# Patient Record
Sex: FEMALE | Race: WHITE | NOT HISPANIC OR LATINO | Employment: OTHER | URBAN - METROPOLITAN AREA
[De-identification: names, ages, dates, MRNs, and addresses within clinical notes are randomized per-mention and may not be internally consistent; named-entity substitution may affect disease eponyms.]

---

## 2021-05-27 ENCOUNTER — RECORDS - HEALTHEAST (OUTPATIENT)
Dept: ADMINISTRATIVE | Facility: CLINIC | Age: 66
End: 2021-05-27

## 2021-06-02 ENCOUNTER — RECORDS - HEALTHEAST (OUTPATIENT)
Dept: ADMINISTRATIVE | Facility: CLINIC | Age: 66
End: 2021-06-02

## 2022-10-13 ENCOUNTER — NURSE TRIAGE (OUTPATIENT)
Dept: NURSING | Facility: CLINIC | Age: 67
End: 2022-10-13

## 2022-10-13 ENCOUNTER — HOSPITAL ENCOUNTER (OUTPATIENT)
Facility: HOSPITAL | Age: 67
Setting detail: OBSERVATION
Discharge: HOME | End: 2022-10-15
Attending: EMERGENCY MEDICINE | Admitting: FAMILY MEDICINE
Payer: COMMERCIAL

## 2022-10-13 DIAGNOSIS — N39.0 URINARY TRACT INFECTION WITHOUT HEMATURIA, SITE UNSPECIFIED: ICD-10-CM

## 2022-10-13 DIAGNOSIS — T50.904A DRUG OVERDOSE OF UNDETERMINED INTENT, INITIAL ENCOUNTER: ICD-10-CM

## 2022-10-13 DIAGNOSIS — J96.01 ACUTE RESPIRATORY FAILURE WITH HYPOXIA (H): ICD-10-CM

## 2022-10-13 DIAGNOSIS — R40.4 TRANSIENT ALTERATION OF AWARENESS: ICD-10-CM

## 2022-10-13 LAB
ALBUMIN SERPL BCG-MCNC: 4.5 G/DL (ref 3.5–5.2)
ALBUMIN UR-MCNC: 300 MG/DL
ALP SERPL-CCNC: 97 U/L (ref 35–104)
ALT SERPL W P-5'-P-CCNC: 20 U/L (ref 10–35)
AMORPH CRY #/AREA URNS HPF: ABNORMAL /HPF
AMPHETAMINES UR QL SCN: ABNORMAL
ANION GAP SERPL CALCULATED.3IONS-SCNC: 13 MMOL/L (ref 7–15)
APAP SERPL-MCNC: <5 UG/ML (ref 10–30)
APPEARANCE UR: ABNORMAL
AST SERPL W P-5'-P-CCNC: 36 U/L (ref 10–35)
BACTERIA #/AREA URNS HPF: ABNORMAL /HPF
BARBITURATES UR QL SCN: ABNORMAL
BASOPHILS # BLD AUTO: 0.1 10E3/UL (ref 0–0.2)
BASOPHILS NFR BLD AUTO: 0 %
BENZODIAZ UR QL SCN: ABNORMAL
BILIRUB SERPL-MCNC: 0.3 MG/DL
BILIRUB UR QL STRIP: NEGATIVE
BUN SERPL-MCNC: 7.1 MG/DL (ref 8–23)
BZE UR QL SCN: ABNORMAL
CALCIUM SERPL-MCNC: 9.2 MG/DL (ref 8.8–10.2)
CANNABINOIDS UR QL SCN: ABNORMAL
CHLORIDE SERPL-SCNC: 103 MMOL/L (ref 98–107)
COLOR UR AUTO: YELLOW
CREAT SERPL-MCNC: 1.16 MG/DL (ref 0.51–0.95)
DEPRECATED HCO3 PLAS-SCNC: 23 MMOL/L (ref 22–29)
EOSINOPHIL # BLD AUTO: 0.3 10E3/UL (ref 0–0.7)
EOSINOPHIL NFR BLD AUTO: 2 %
ERYTHROCYTE [DISTWIDTH] IN BLOOD BY AUTOMATED COUNT: 13.7 % (ref 10–15)
ETHANOL SERPL-MCNC: <0.01 G/DL
GFR SERPL CREATININE-BSD FRML MDRD: 51 ML/MIN/1.73M2
GLUCOSE SERPL-MCNC: 157 MG/DL (ref 70–99)
GLUCOSE UR STRIP-MCNC: NEGATIVE MG/DL
HCT VFR BLD AUTO: 42.8 % (ref 35–47)
HGB BLD-MCNC: 13.8 G/DL (ref 11.7–15.7)
HGB UR QL STRIP: ABNORMAL
HOLD SPECIMEN: NORMAL
HYALINE CASTS: 266 /LPF
IMM GRANULOCYTES # BLD: 0.2 10E3/UL
IMM GRANULOCYTES NFR BLD: 1 %
KETONES UR STRIP-MCNC: NEGATIVE MG/DL
LEUKOCYTE ESTERASE UR QL STRIP: NEGATIVE
LIPASE SERPL-CCNC: 44 U/L (ref 13–60)
LYMPHOCYTES # BLD AUTO: 2.4 10E3/UL (ref 0.8–5.3)
LYMPHOCYTES NFR BLD AUTO: 15 %
MCH RBC QN AUTO: 28 PG (ref 26.5–33)
MCHC RBC AUTO-ENTMCNC: 32.2 G/DL (ref 31.5–36.5)
MCV RBC AUTO: 87 FL (ref 78–100)
MONOCYTES # BLD AUTO: 1 10E3/UL (ref 0–1.3)
MONOCYTES NFR BLD AUTO: 6 %
MUCOUS THREADS #/AREA URNS LPF: PRESENT /LPF
NEUTROPHILS # BLD AUTO: 12.4 10E3/UL (ref 1.6–8.3)
NEUTROPHILS NFR BLD AUTO: 76 %
NITRATE UR QL: NEGATIVE
NRBC # BLD AUTO: 0 10E3/UL
NRBC BLD AUTO-RTO: 0 /100
OPIATES UR QL SCN: ABNORMAL
PCP QUAL URINE (ROCHE): ABNORMAL
PH UR STRIP: 5.5 [PH] (ref 5–7)
PLATELET # BLD AUTO: 264 10E3/UL (ref 150–450)
POTASSIUM SERPL-SCNC: 3.9 MMOL/L (ref 3.4–5.3)
PROT SERPL-MCNC: 7.5 G/DL (ref 6.4–8.3)
RBC # BLD AUTO: 4.92 10E6/UL (ref 3.8–5.2)
RBC URINE: 2 /HPF
SALICYLATES SERPL-MCNC: <0.5 MG/DL
SODIUM SERPL-SCNC: 139 MMOL/L (ref 136–145)
SP GR UR STRIP: 1.04 (ref 1–1.03)
SQUAMOUS EPITHELIAL: 4 /HPF
T4 FREE SERPL-MCNC: 1 NG/DL (ref 0.9–1.7)
TSH SERPL DL<=0.005 MIU/L-ACNC: 4.23 UIU/ML (ref 0.3–4.2)
UROBILINOGEN UR STRIP-MCNC: 2 MG/DL
WBC # BLD AUTO: 16.2 10E3/UL (ref 4–11)
WBC URINE: 7 /HPF

## 2022-10-13 PROCEDURE — 80179 DRUG ASSAY SALICYLATE: CPT | Performed by: EMERGENCY MEDICINE

## 2022-10-13 PROCEDURE — 80143 DRUG ASSAY ACETAMINOPHEN: CPT | Performed by: EMERGENCY MEDICINE

## 2022-10-13 PROCEDURE — 36415 COLL VENOUS BLD VENIPUNCTURE: CPT | Performed by: EMERGENCY MEDICINE

## 2022-10-13 PROCEDURE — 80053 COMPREHEN METABOLIC PANEL: CPT | Performed by: EMERGENCY MEDICINE

## 2022-10-13 PROCEDURE — 84443 ASSAY THYROID STIM HORMONE: CPT | Performed by: EMERGENCY MEDICINE

## 2022-10-13 PROCEDURE — 81001 URINALYSIS AUTO W/SCOPE: CPT | Performed by: EMERGENCY MEDICINE

## 2022-10-13 PROCEDURE — 84439 ASSAY OF FREE THYROXINE: CPT | Performed by: EMERGENCY MEDICINE

## 2022-10-13 PROCEDURE — 85025 COMPLETE CBC W/AUTO DIFF WBC: CPT | Performed by: EMERGENCY MEDICINE

## 2022-10-13 PROCEDURE — 83690 ASSAY OF LIPASE: CPT | Performed by: EMERGENCY MEDICINE

## 2022-10-13 PROCEDURE — 93005 ELECTROCARDIOGRAM TRACING: CPT | Performed by: EMERGENCY MEDICINE

## 2022-10-13 PROCEDURE — 82077 ASSAY SPEC XCP UR&BREATH IA: CPT | Performed by: EMERGENCY MEDICINE

## 2022-10-13 PROCEDURE — 99285 EMERGENCY DEPT VISIT HI MDM: CPT | Mod: 25

## 2022-10-13 PROCEDURE — 80307 DRUG TEST PRSMV CHEM ANLYZR: CPT | Performed by: EMERGENCY MEDICINE

## 2022-10-13 RX ORDER — NITROFURANTOIN 25; 75 MG/1; MG/1
100 CAPSULE ORAL 2 TIMES DAILY
Qty: 14 CAPSULE | Refills: 0 | Status: SHIPPED | OUTPATIENT
Start: 2022-10-13 | End: 2022-10-15

## 2022-10-13 ASSESSMENT — ACTIVITIES OF DAILY LIVING (ADL)
ADLS_ACUITY_SCORE: 35
ADLS_ACUITY_SCORE: 35

## 2022-10-14 ENCOUNTER — APPOINTMENT (OUTPATIENT)
Dept: MRI IMAGING | Facility: HOSPITAL | Age: 67
End: 2022-10-14
Attending: EMERGENCY MEDICINE
Payer: COMMERCIAL

## 2022-10-14 ENCOUNTER — APPOINTMENT (OUTPATIENT)
Dept: RADIOLOGY | Facility: HOSPITAL | Age: 67
End: 2022-10-14
Attending: STUDENT IN AN ORGANIZED HEALTH CARE EDUCATION/TRAINING PROGRAM
Payer: COMMERCIAL

## 2022-10-14 PROBLEM — J44.9 CHRONIC OBSTRUCTIVE PULMONARY DISEASE, UNSPECIFIED COPD TYPE (H): Status: ACTIVE | Noted: 2022-02-08

## 2022-10-14 PROBLEM — F32.5 MAJOR DEPRESSIVE DISORDER WITH SINGLE EPISODE, IN FULL REMISSION (H): Status: ACTIVE | Noted: 2021-05-05

## 2022-10-14 PROBLEM — K21.9 GASTROESOPHAGEAL REFLUX DISEASE WITHOUT ESOPHAGITIS: Status: ACTIVE | Noted: 2017-03-06

## 2022-10-14 LAB
ATRIAL RATE - MUSE: 56 BPM
DIASTOLIC BLOOD PRESSURE - MUSE: 85 MMHG
GLUCOSE BLDC GLUCOMTR-MCNC: 99 MG/DL (ref 70–99)
INTERPRETATION ECG - MUSE: NORMAL
P AXIS - MUSE: 57 DEGREES
PR INTERVAL - MUSE: 168 MS
QRS DURATION - MUSE: 78 MS
QT - MUSE: 424 MS
QTC - MUSE: 409 MS
R AXIS - MUSE: -20 DEGREES
SARS-COV-2 RNA RESP QL NAA+PROBE: NEGATIVE
SYSTOLIC BLOOD PRESSURE - MUSE: 173 MMHG
T AXIS - MUSE: 64 DEGREES
VENTRICULAR RATE- MUSE: 56 BPM

## 2022-10-14 PROCEDURE — 70553 MRI BRAIN STEM W/O & W/DYE: CPT

## 2022-10-14 PROCEDURE — G0378 HOSPITAL OBSERVATION PER HR: HCPCS

## 2022-10-14 PROCEDURE — A9585 GADOBUTROL INJECTION: HCPCS | Performed by: FAMILY MEDICINE

## 2022-10-14 PROCEDURE — U0003 INFECTIOUS AGENT DETECTION BY NUCLEIC ACID (DNA OR RNA); SEVERE ACUTE RESPIRATORY SYNDROME CORONAVIRUS 2 (SARS-COV-2) (CORONAVIRUS DISEASE [COVID-19]), AMPLIFIED PROBE TECHNIQUE, MAKING USE OF HIGH THROUGHPUT TECHNOLOGIES AS DESCRIBED BY CMS-2020-01-R: HCPCS | Performed by: STUDENT IN AN ORGANIZED HEALTH CARE EDUCATION/TRAINING PROGRAM

## 2022-10-14 PROCEDURE — 250N000011 HC RX IP 250 OP 636: Performed by: STUDENT IN AN ORGANIZED HEALTH CARE EDUCATION/TRAINING PROGRAM

## 2022-10-14 PROCEDURE — 250N000013 HC RX MED GY IP 250 OP 250 PS 637: Performed by: STUDENT IN AN ORGANIZED HEALTH CARE EDUCATION/TRAINING PROGRAM

## 2022-10-14 PROCEDURE — 93005 ELECTROCARDIOGRAM TRACING: CPT | Performed by: EMERGENCY MEDICINE

## 2022-10-14 PROCEDURE — 71046 X-RAY EXAM CHEST 2 VIEWS: CPT

## 2022-10-14 PROCEDURE — 96375 TX/PRO/DX INJ NEW DRUG ADDON: CPT | Mod: 59

## 2022-10-14 PROCEDURE — 96374 THER/PROPH/DIAG INJ IV PUSH: CPT

## 2022-10-14 PROCEDURE — 93010 ELECTROCARDIOGRAM REPORT: CPT | Performed by: INTERNAL MEDICINE

## 2022-10-14 PROCEDURE — 255N000002 HC RX 255 OP 636: Performed by: FAMILY MEDICINE

## 2022-10-14 PROCEDURE — 99220 PR INITIAL OBSERVATION CARE,LEVEL III: CPT | Mod: GC | Performed by: STUDENT IN AN ORGANIZED HEALTH CARE EDUCATION/TRAINING PROGRAM

## 2022-10-14 PROCEDURE — 250N000011 HC RX IP 250 OP 636: Performed by: EMERGENCY MEDICINE

## 2022-10-14 PROCEDURE — C9803 HOPD COVID-19 SPEC COLLECT: HCPCS

## 2022-10-14 PROCEDURE — 96376 TX/PRO/DX INJ SAME DRUG ADON: CPT | Mod: 59

## 2022-10-14 RX ORDER — ROSUVASTATIN CALCIUM 40 MG/1
40 TABLET, COATED ORAL DAILY
Status: DISCONTINUED | OUTPATIENT
Start: 2022-10-14 | End: 2022-10-15 | Stop reason: HOSPADM

## 2022-10-14 RX ORDER — AMLODIPINE BESYLATE 5 MG/1
5 TABLET ORAL DAILY
COMMUNITY
Start: 2022-05-19

## 2022-10-14 RX ORDER — ONDANSETRON 4 MG/1
4 TABLET, ORALLY DISINTEGRATING ORAL EVERY 6 HOURS PRN
Status: DISCONTINUED | OUTPATIENT
Start: 2022-10-14 | End: 2022-10-15 | Stop reason: HOSPADM

## 2022-10-14 RX ORDER — AMLODIPINE BESYLATE 5 MG/1
5 TABLET ORAL DAILY
Status: DISCONTINUED | OUTPATIENT
Start: 2022-10-14 | End: 2022-10-15 | Stop reason: HOSPADM

## 2022-10-14 RX ORDER — ERGOCALCIFEROL 1.25 MG/1
50000 CAPSULE ORAL
COMMUNITY
End: 2022-10-14

## 2022-10-14 RX ORDER — ROSUVASTATIN CALCIUM 40 MG/1
40 TABLET, COATED ORAL DAILY
COMMUNITY
Start: 2022-09-29

## 2022-10-14 RX ORDER — PROCHLORPERAZINE 25 MG
12.5 SUPPOSITORY, RECTAL RECTAL EVERY 12 HOURS PRN
Status: DISCONTINUED | OUTPATIENT
Start: 2022-10-14 | End: 2022-10-15 | Stop reason: HOSPADM

## 2022-10-14 RX ORDER — PSEUDOEPHEDRINE HCL 60 MG
60 TABLET ORAL EVERY 6 HOURS PRN
COMMUNITY

## 2022-10-14 RX ORDER — ALENDRONATE SODIUM 70 MG/1
70 TABLET ORAL
COMMUNITY
Start: 2022-03-25

## 2022-10-14 RX ORDER — BACLOFEN 10 MG/1
10 TABLET ORAL 3 TIMES DAILY PRN
COMMUNITY
Start: 2022-03-11

## 2022-10-14 RX ORDER — ONDANSETRON 2 MG/ML
4 INJECTION INTRAMUSCULAR; INTRAVENOUS ONCE
Status: COMPLETED | OUTPATIENT
Start: 2022-10-14 | End: 2022-10-14

## 2022-10-14 RX ORDER — LEVOTHYROXINE SODIUM 25 UG/1
50 TABLET ORAL DAILY
Status: DISCONTINUED | OUTPATIENT
Start: 2022-10-14 | End: 2022-10-15 | Stop reason: HOSPADM

## 2022-10-14 RX ORDER — GADOBUTROL 604.72 MG/ML
8 INJECTION INTRAVENOUS ONCE
Status: COMPLETED | OUTPATIENT
Start: 2022-10-14 | End: 2022-10-14

## 2022-10-14 RX ORDER — PANTOPRAZOLE SODIUM 40 MG/1
40 TABLET, DELAYED RELEASE ORAL DAILY
Status: DISCONTINUED | OUTPATIENT
Start: 2022-10-14 | End: 2022-10-15 | Stop reason: HOSPADM

## 2022-10-14 RX ORDER — PROCHLORPERAZINE MALEATE 5 MG
5 TABLET ORAL EVERY 6 HOURS PRN
Status: DISCONTINUED | OUTPATIENT
Start: 2022-10-14 | End: 2022-10-15 | Stop reason: HOSPADM

## 2022-10-14 RX ORDER — ALBUTEROL SULFATE 90 UG/1
1-2 AEROSOL, METERED RESPIRATORY (INHALATION) EVERY 6 HOURS PRN
COMMUNITY
Start: 2022-02-08

## 2022-10-14 RX ORDER — CITALOPRAM HYDROBROMIDE 20 MG/1
20 TABLET ORAL DAILY
COMMUNITY
Start: 2022-05-02

## 2022-10-14 RX ORDER — SIMVASTATIN 40 MG
40 TABLET ORAL
COMMUNITY
End: 2022-10-14

## 2022-10-14 RX ORDER — CITALOPRAM HYDROBROMIDE 10 MG/1
20 TABLET ORAL DAILY
Status: DISCONTINUED | OUTPATIENT
Start: 2022-10-14 | End: 2022-10-15 | Stop reason: HOSPADM

## 2022-10-14 RX ORDER — PANTOPRAZOLE SODIUM 40 MG/1
40 TABLET, DELAYED RELEASE ORAL DAILY
COMMUNITY
Start: 2022-02-22

## 2022-10-14 RX ORDER — ONDANSETRON 2 MG/ML
4 INJECTION INTRAMUSCULAR; INTRAVENOUS EVERY 6 HOURS PRN
Status: DISCONTINUED | OUTPATIENT
Start: 2022-10-14 | End: 2022-10-15 | Stop reason: HOSPADM

## 2022-10-14 RX ORDER — LEVOTHYROXINE SODIUM 50 UG/1
50 TABLET ORAL DAILY
COMMUNITY
Start: 2022-02-09

## 2022-10-14 RX ADMIN — ROSUVASTATIN CALCIUM 40 MG: 40 TABLET, COATED ORAL at 13:47

## 2022-10-14 RX ADMIN — FLUTICASONE FUROATE AND VILANTEROL TRIFENATATE 1 PUFF: 200; 25 POWDER RESPIRATORY (INHALATION) at 13:47

## 2022-10-14 RX ADMIN — LEVOTHYROXINE SODIUM 50 MCG: 0.03 TABLET ORAL at 13:47

## 2022-10-14 RX ADMIN — ONDANSETRON 4 MG: 2 INJECTION INTRAMUSCULAR; INTRAVENOUS at 10:38

## 2022-10-14 RX ADMIN — CITALOPRAM HYDROBROMIDE 20 MG: 20 TABLET ORAL at 13:46

## 2022-10-14 RX ADMIN — ONDANSETRON 4 MG: 2 INJECTION INTRAMUSCULAR; INTRAVENOUS at 06:21

## 2022-10-14 RX ADMIN — GADOBUTROL 8 ML: 604.72 INJECTION INTRAVENOUS at 11:59

## 2022-10-14 RX ADMIN — PANTOPRAZOLE SODIUM 40 MG: 40 TABLET, DELAYED RELEASE ORAL at 13:46

## 2022-10-14 RX ADMIN — PROCHLORPERAZINE EDISYLATE 5 MG: 5 INJECTION INTRAMUSCULAR; INTRAVENOUS at 13:55

## 2022-10-14 ASSESSMENT — ACTIVITIES OF DAILY LIVING (ADL)
ADLS_ACUITY_SCORE: 35

## 2022-10-14 NOTE — H&P
[facility]    History and Physical - Hospitalist Service       Date of Admission:  10/13/2022    Assessment & Plan      Carly Pelayo is a 67 year old female admitted on 10/13/2022. She as a history of HTN, MDD, asthma, hypothyroidism, HLD, and chronic back pain presenting with lethargy and nausea.    Acute hypoxic respiratory failure (requiring 1L O2)  Adverse drug reaction  Weakness  Nausea  Patient lives independently at home and has a history of low back pain on baclofen at home. Endorses nausea and fatigue in setting of taking 2 baclofen (total 20 mg dose) and 1/2 dose of buprenorphine yesterday (2 mg dose) which her sister gave her. ED had discussed case with [place] [facility] who attributed her symptoms of excessive somnolence to new buprenorphine + baclofen usage. Denies SI with usage. Discussed with sister and unlikely that she has taken anything else that has not been prescribed. Patient denies any CP/SOB. Unlikely that symptoms are due to PNA, spont PTX, COVID, flu, ACS, stroke, electrolyte imbalances, URI, hypoglycemia (BG 99). ETOH, acetaminophen and salicylate levels normal. TSH high normal. UA not overly concerning for UTI. Most likely her symptoms are due to medication combination of baclofen and suboxone in an opioid naive patient. Hypoxia is possibly chronic given her COPD. Not on home O2.  - Given that this is different behavior for her per sisters, MRI obtained by ED. Will follow up MRI results  - PT/OT for home safety eval  - Wean O2 as tolerated. Goal in COPD 88-92%  - CXR 2 view today  - PRN antiemetics available    +UDS  Tobacco dependence  UDS positive for meth but likely false positive due to pseudoephedrine daily usage. Smokes 1 ppw of cigarettes x 40-50 years. UDS + for cannabinoids although she tells me this is rare. No other reported drug use. Declines nicotine patches/gum/lozenges.     Chronic Medical Conditions  HTN - continue PTA amlodipine  Depression -  "continue PTA celexa  HLD - continue PTA statin  Back pain - hold PTA baclofen.       Diet: Regular Diet Adult   DVT Prophylaxis: Low Risk/Ambulatory with no VTE prophylaxis indicated  Betts Catheter: Not present  Fluids: PO  Central Lines: None  Cardiac Monitoring: None  Code Status: Full Code    Clinically Significant Risk Factors Present on Admission                    # Obesity: Estimated body mass index is 33.66 kg/m  as calculated from the following:    Height as of this encounter: 1.6 m (5' 3\").    Weight as of this encounter: 86.2 kg (190 lb).        Disposition Plan      Expected Discharge Date: 10/15/2022            Pending workup and off O2, possible discharge today vs tomorrow.     The patient's care was discussed with the Attending Physician, Dr. Cormier.    Renata Lovell MD  Hospitalist Service  [facility]  Securely message with the Vocera Web Console (learn more here)  Text page via [facility] Paging/Directory       ______________________________________________________________________    Chief Complaint   Weak    History is obtained from the patient and the patient's sister.    History of Present Illness   Carly Pelayo is a 67 year old female who has a history of HTN, HLD, hypothyroidism, depression and chronic low back pain and is admitted for lethargy and thought to be drug intoxication/adverse reaction.     Last night, Carly reports that she was having increased low back pain yesterday a bit more than her chronic low back pain and so she took her prescribed baclofen twice (20 mg total). She said it never usually works for her so that is also why she took two pills. She then went over to her sister's house in [facility] [place] and got there around 430pm. Her sister says that Carly was her normal self then. She had gone in to the bathroom and More (sister) thought it was her IBS as she was in there for 20 min. More entered the bathroom and found Carly hovered over the " "toilet moaning and groaning in pain. Of note, More says \"when Carly doesn't feel well, the whole world knows about it.\" She was saying that her stomach was hurting and that she felt nauseous. She came out of the bathroom and asked More for any pain pills for her back that was still hurting. More was about to take her scheduled medications at that time and had said no initially to Carly's request but did end up giving her half tab of her buprenorphine (2 mg). That happened around 5pm.  More then says a bit later, Carly was lethargic and looked pale, like she was about to pass out. Almost \"drunk\" in appearance. She had fallen asleep on the cough and was snoring loudly so they woke her up and asked what else she took (Sudafed as she does every day for allergies) and only had a Pepsi and a cookie all day. They were worried she was having a stroke so they brought her in.     She denies headache, cough, shortness of breath, chest pain, vomiting, abdominal pain, new constipation/diarrhea, numbness, tingling, swelling in extremities    Review of Systems    The 10 point Review of Systems is negative other than noted in the HPI or here.     Past Medical History    HTN  HLD  Hypothyroidism  COPD  GERD  Chronic low back pain  Cigarette smoker    Past Surgical History   Surgery Date Site/Laterality Comments   HYSTERECTOMY 1974   RAHAT/BSO , partial rectal excision; age 18      CHOLECYSTECTOMY 1998   L-scope      APPENDECTOMY 1998   laparotomy      open repair right wrist fracture 2010   titanium plate          Social History   Current smoker (1ppw x 40-50 years)  Rare ETOH use  Occ. Cannabis, no other illicit drug use.    Family History   No significant family history per patient    Prior to Admission Medications   Prior to Admission Medications   Prescriptions Last Dose Informant Patient Reported? Taking?   albuterol (PROAIR HFA/PROVENTIL HFA/VENTOLIN HFA) 108 (90 Base) MCG/ACT inhaler Unknown  Yes Yes   Sig: Inhale " 1-2 puffs into the lungs every 6 hours as needed   alendronate (FOSAMAX) 70 MG tablet 10/10/2022  Yes Yes   Sig: Take 70 mg by mouth every 7 days Mondays   amLODIPine (NORVASC) 5 MG tablet 10/13/2022  Yes Yes   Sig: Take 5 mg by mouth daily   baclofen (LIORESAL) 10 MG tablet 10/13/2022  Yes Yes   Sig: Take 10 mg by mouth 3 times daily as needed   citalopram (CELEXA) 20 MG tablet 10/13/2022  Yes Yes   Sig: Take 20 mg by mouth daily   fluticasone-vilanterol (BREO ELLIPTA) 200-25 MCG/INH inhaler 10/13/2022  Yes Yes   Sig: Inhale 1 puff into the lungs daily   levothyroxine (SYNTHROID/LEVOTHROID) 50 MCG tablet 10/13/2022  Yes Yes   Sig: Take 50 mcg by mouth daily   melatonin 1 MG TABS tablet 10/12/2022  Yes Yes   Sig: Take 1 mg by mouth At Bedtime   pantoprazole (PROTONIX) 40 MG EC tablet 10/13/2022  Yes Yes   Sig: Take 40 mg by mouth daily   pseudoePHEDrine (SUDAFED) 60 MG tablet 10/13/2022  Yes Yes   Sig: Take 60 mg by mouth every 6 hours as needed   rosuvastatin (CRESTOR) 40 MG tablet 10/13/2022  Yes Yes   Sig: Take 40 mg by mouth daily      Facility-Administered Medications: None     Allergies   Allergies   Allergen Reactions     Erythromycin Unknown     Morphine Unknown     Oxycodone Unknown     Seafood Unknown       Physical Exam   Vital Signs: Temp: 98  F (36.7  C) Temp src: Oral BP: 105/57 Pulse: 57   Resp: 16 SpO2: 95 % O2 Device: Nasal cannula Oxygen Delivery: 1 LPM  Weight: 190 lbs 0 oz    Constitutional: awake, alert, cooperative and no apparent distress  Eyes: lids and lashes normal, pupils equal, round and reactive to light and extra-ocular muscles intact  ENT: normocepalic, without obvious abnormality  Respiratory: No increased work of breathing, good air exchange, clear to auscultation bilaterally, no crackles or wheezing  Cardiovascular: regular rate and rhythm, normal S1 and S2, no murmur noted and no edema  GI: soft, non-distended and non-tender  Musculoskeletal: there is no redness, warmth, or  swelling of the joints  full range of motion noted  Neurologic: Awake, alert, oriented to name, place and time.  Cranial nerves II-XII are grossly intact.  Motor is 5 out of 5 bilaterally.    Neuropsychiatric: General: normal, calm and normal eye contact  Level of consciousness: alert / normal  Affect: normal and pleasant  Memory and insight: normal, memory for past and recent events intact and thought process normal    Data   Data reviewed today: I reviewed all medications, new labs and imaging results over the last 24 hours. I personally reviewed the EKG tracing showing sinus juan m.    Recent Labs   Lab 10/14/22  0617 10/13/22  2116   WBC  --  16.2*   HGB  --  13.8   MCV  --  87   PLT  --  264   NA  --  139   POTASSIUM  --  3.9   CHLORIDE  --  103   CO2  --  23   BUN  --  7.1*   CR  --  1.16*   ANIONGAP  --  13   GINO  --  9.2   GLC 99 157*   ALBUMIN  --  4.5   PROTTOTAL  --  7.5   BILITOTAL  --  0.3   ALKPHOS  --  97   ALT  --  20   AST  --  36*   LIPASE  --  44     No results found for this or any previous visit (from the past 24 hour(s)).

## 2022-10-14 NOTE — PHARMACY-ADMISSION MEDICATION HISTORY
Pharmacy Note - Admission Medication History    Pertinent Provider Information: Suboxone not added to list as it is not prescribed to patient.   ______________________________________________________________________    Prior To Admission (PTA) med list completed and updated in EMR.       PTA Med List   Medication Sig Last Dose     albuterol (PROAIR HFA/PROVENTIL HFA/VENTOLIN HFA) 108 (90 Base) MCG/ACT inhaler Inhale 1-2 puffs into the lungs every 6 hours as needed Unknown     alendronate (FOSAMAX) 70 MG tablet Take 70 mg by mouth every 7 days Mondays 10/10/2022     amLODIPine (NORVASC) 5 MG tablet Take 5 mg by mouth daily 10/13/2022     baclofen (LIORESAL) 10 MG tablet Take 10 mg by mouth 3 times daily as needed 10/13/2022     citalopram (CELEXA) 20 MG tablet Take 20 mg by mouth daily 10/13/2022     fluticasone-vilanterol (BREO ELLIPTA) 200-25 MCG/INH inhaler Inhale 1 puff into the lungs daily 10/13/2022     levothyroxine (SYNTHROID/LEVOTHROID) 50 MCG tablet Take 50 mcg by mouth daily 10/13/2022     melatonin 1 MG TABS tablet Take 1 mg by mouth At Bedtime 10/12/2022     nitroFURantoin macrocrystal-monohydrate (MACROBID) 100 MG capsule Take 1 capsule (100 mg) by mouth 2 times daily      pantoprazole (PROTONIX) 40 MG EC tablet Take 40 mg by mouth daily 10/13/2022     pseudoePHEDrine (SUDAFED) 60 MG tablet Take 60 mg by mouth every 6 hours as needed 10/13/2022     rosuvastatin (CRESTOR) 40 MG tablet Take 40 mg by mouth daily 10/13/2022       Information source(s): Patient and CareEverywhere/SureScripts  Method of interview communication: in-person    Summary of Changes to PTA Med List  New: all -- no prior records in EMR    Patient was asked about OTC/herbal products specifically.  PTA med list reflects this.    Allergies were reviewed, assessed, and updated with the patient.      Patient did not bring any medications to the hospital and can't retrieve from home. No multi-dose medications are available for use during  hospital stay.     The information provided in this note is only as accurate as the sources available at the time of the update(s).    Thank you for the opportunity to participate in the care of this patient.    Karo Lo [facility]  10/14/2022 9:31 AM

## 2022-10-14 NOTE — ED TRIAGE NOTES
"Pt arrives via [place] [facility]. Around 1800, pt's sister found pt lethargic. Pt states she took Two Baclofen and a \"pain medication\" her sister gave her. Pt states her stomach started hurting after taking the medicine. Pt keeps falling asleep and sats drop to high 80s. Pt placed on 2lpm O2.     Buprenorphine was the med she took at 1630 per pt's sister. She states \"it was a little tiny sliver of the medicine\" Per sister, pt states she has had nausea and abdominal pain since yesterday.      Triage Assessment       Row Name 10/13/22 0673       Triage Assessment (Adult)    Airway WDL WDL       Respiratory WDL    Respiratory WDL WDL       Skin Circulation/Temperature WDL    Skin Circulation/Temperature WDL WDL       Cardiac WDL    Cardiac WDL WDL       Peripheral/Neurovascular WDL    Peripheral Neurovascular WDL WDL       Cognitive/Neuro/Behavioral WDL    Cognitive/Neuro/Behavioral WDL X;arousability    Level of Consciousness lethargic    Orientation oriented x 4                  "

## 2022-10-14 NOTE — ED PROVIDER NOTES
"EMERGENCY DEPARTMENT ENCOUNTER      NAME: Carly Pelayo  AGE: 67 year old female  YOB: 1955  MRN: [id number]  EVALUATION DATE & TIME: 10/13/2022  8:50 PM    PCP: No primary care provider on file.    ED PROVIDER: Ayah Sim M.D.      Chief Complaint   Patient presents with     Drug Overdose     Lethargy         FINAL IMPRESSION:  1. Drug overdose of undetermined intent, initial encounter    2. Urinary tract infection without hematuria, site unspecified          ED COURSE & MEDICAL DECISION MAKING:    ED Course as of 10/14/22 0009   Thu Oct 13, 2022   2127 Pt floridly intoxicated appearing and falling asleep repeatedly during HPI aquisition, sister on phone admits to giving Carly a \"sliver\" of buprenorphine and paitent admits she took two of her baclofen instead of one tonight like usual for her chronic back pain. Neurovascularly intact except for very somnolent, no abdominal pain at all per her, just some slight nausea, she wasn't trying to treat any new pain with the medications tonight. EKG reassuring, UDS and EtOH  / acetaminophen/salicylate/TSH/LFTs pending and will clinically reassess for sobriety, family updated by NNAMDI Olson   5450 Pt with + cannabinoids and amphetamines, admits to using two instead of one baclofen and sister's buprenorphone, with intoxication. Will continue to await improved sobriety. TSH slightly elevated.  UA without substantial cells but with WBC 7 and a few squames. Possible early UTI, will begin macrobid.    7495 I spoke with [place] Poison control re: presentation who notes buprenorphine in patient who is not used to using it with double dose home baclofen would account for excessive somnolence, no use of tramadol/wellbutrin to trigger amphetamine +, she recommends observe 12h overnight for improvement in somnolence until mentaitng well, baclofen could lead to mild badycardia or hypotension (not in this case currently thankfully), when she appears sobered/improved " then okay to discharge, if respiratory compromise can administer naloxone if needed. Will reassess now.   Fri Oct 14, 2022   0004 Pt reassessed and still somewhat groggy appearing, admits to daily abuse of sudafed for over a year, then adds she did not realize it isn't okay to take sudafed every day. I spoke with one of her sisters at bedside, not the one who provided buprenorphine, who notes to patient that she has several times told patient she cannot use sudafed nearly daily or daily, and patient surprised to learn daily sudafed can be harmful or addictive. She denies SI, notes she did take double her baclofen and doesn't usually do that, and does reema to taking her other sister's suboxone simultaneously but vows not to do that in the future, aware it was dangerous and of poison control recommendation to watch overnight and discharge in the morning, other sister at bedside is happy to pick her up in the AM, adds patient did have history of using her sleeping medications above prescribed levels formerly, patient now promising to take her medications only as prescribed, lives alone, initially wanted to go home but now since she lives alone and s/p possible dangerous ingestion amenable to ED observation overnight.  Patient signed out to PM ED MD pending clinical reassessment in the AM and discharge in the morning after overdose.     9:09 PM Met with patient for initial interview and exam. Discussed initial plan for care for their stay in the emergency department.    Pertinent Labs & Imaging studies reviewed. (See chart for details)    N95 worn  A face shield was worn also  COVID PPE      At the conclusion of the encounter I discussed the results of all of the tests and the disposition. The questions were answered. The patient or family acknowledged understanding and was agreeable with the care plan.     Critical Care time was 45 minutes for this patient excluding procedures.      MEDICATIONS GIVEN IN THE  "EMERGENCY:  Medications - No data to display    NEW PRESCRIPTIONS STARTED AT TODAY'S ER VISIT  New Prescriptions    NITROFURANTOIN MACROCRYSTAL-MONOHYDRATE (MACROBID) 100 MG CAPSULE    Take 1 capsule (100 mg) by mouth 2 times daily          =================================================================    HPI  HPI limited secondary to intoxication    Carly Pelayo is a 67 year old female with PMHx of chronic fatigue, chronic constipation, COPD, obesity and mental health diagnoses who presents to the ED today via EMS with lethargy    Per nurse patient's sister found the patient to be lethargic. Patient's sister believes the patient took two of baclofen which is prescribed for her pain. Patient's sister reports having given the patient a \"sliver\" of buprenorphine at around 1630. She reports the patient began to develop nausea with dry heaving after this. Per nurse patient placed on oxygen as when she falls asleep sats drop to 80% on room air.     Per patient they repeatedly doze off during initial examination. Patient notes her back having hurt her earlier today for which she took two of baclofen. Patient notes her sister gave her 1/2 a pill which she just took despite not knowing what it was. She denies abdominal pain but is endorsing nausea. Patient denies any alcohol or drugs tonight as well as any other complaints at the time.     REVIEW OF SYSTEMS   ROS limited secondary to intoxication    PAST MEDICAL HISTORY:  History reviewed. No pertinent past medical history.    PAST SURGICAL HISTORY:  History reviewed. No pertinent surgical history.    CURRENT MEDICATIONS:    nitroFURantoin macrocrystal-monohydrate (MACROBID) 100 MG capsule        ALLERGIES:  Allergies   Allergen Reactions     Erythromycin Unknown     Morphine Unknown     Oxycodone Unknown     Seafood Unknown       FAMILY HISTORY:  No family history on file.    SOCIAL HISTORY:   Social History     Socioeconomic History     Marital status: Single "   Tobacco Use     Smoking status: Every Day       VITALS:  Patient Vitals for the past 24 hrs:   BP Pulse Resp SpO2 Weight   10/13/22 2206 115/56 67 16 96 % 86.2 kg (190 lb)   10/13/22 2104 139/67 -- 16 -- --       PHYSICAL EXAM    GENERAL: Awake, alert.  In no acute distress.   HEENT: Normocephalic, atraumatic. Pinpoint pupils.  Conjunctiva normal.  EOMI.  NECK: No stridor or apparent deformity.  PULMONARY: Symmetrical breath sounds without distress.  Lungs clear to auscultation bilaterally without wheezes, rhonchi or rales.  CARDIO: Regular rate and rhythm.  No significant murmur, rub or gallop.  Radial pulses strong and symmetrical.  ABDOMINAL: Abdomen soft, non-distended and non-tender to palpation.  No CVAT, no palpable hepatosplenomegaly.  EXTREMITIES: No lower extremity swelling or edema.    NEURO: oriented to person, place and time. Cranial nerves grossly intact.  No focal motor deficit. Dozing off during exam.  PSYCH: Normal mood. Intoxicated affect. Dozing off during exam.   SKIN: No rashes      LAB:  All pertinent labs reviewed and interpreted.  Results for orders placed or performed during the hospital encounter of 10/13/22   Extra Blue Top Tube   Result Value Ref Range    Hold Specimen JIC    Extra Red Top Tube   Result Value Ref Range    Hold Specimen JIC    Extra Green Top (Lithium Heparin) Tube   Result Value Ref Range    Hold Specimen JIC    Extra Purple Top Tube   Result Value Ref Range    Hold Specimen JIC    Comprehensive metabolic panel   Result Value Ref Range    Sodium 139 136 - 145 mmol/L    Potassium 3.9 3.4 - 5.3 mmol/L    Chloride 103 98 - 107 mmol/L    Carbon Dioxide (CO2) 23 22 - 29 mmol/L    Anion Gap 13 7 - 15 mmol/L    Urea Nitrogen 7.1 (L) 8.0 - 23.0 mg/dL    Creatinine 1.16 (H) 0.51 - 0.95 mg/dL    Calcium 9.2 8.8 - 10.2 mg/dL    Glucose 157 (H) 70 - 99 mg/dL    Alkaline Phosphatase 97 35 - 104 U/L    AST 36 (H) 10 - 35 U/L    ALT 20 10 - 35 U/L    Protein Total 7.5 6.4 - 8.3 g/dL     Albumin 4.5 3.5 - 5.2 g/dL    Bilirubin Total 0.3 <=1.2 mg/dL    GFR Estimate 51 (L) >60 mL/min/1.73m2   Result Value Ref Range    Lipase 44 13 - 60 U/L   TSH with free T4 reflex   Result Value Ref Range    TSH 4.23 (H) 0.30 - 4.20 uIU/mL   Ethyl Alcohol Level   Result Value Ref Range    Alcohol ethyl <0.01 (H) <=0.00 g/dL   Acetaminophen level   Result Value Ref Range    Acetaminophen <5.0 (L) 10.0 - 30.0 ug/mL   Salicylate level   Result Value Ref Range    Salicylate <0.5   mg/dL   UA with Microscopic reflex to Culture    Specimen: Urine, Catheter   Result Value Ref Range    Color Urine Yellow Colorless, Straw, Light Yellow, Yellow    Appearance Urine Turbid (A) Clear    Glucose Urine Negative Negative mg/dL    Bilirubin Urine Negative Negative    Ketones Urine Negative Negative mg/dL    Specific Gravity Urine 1.036 (H) 1.001 - 1.030    Blood Urine 0.2 mg/dL (A) Negative    pH Urine 5.5 5.0 - 7.0    Protein Albumin Urine 300 (A) Negative mg/dL    Urobilinogen Urine 2.0 (A) <2.0 mg/dL    Nitrite Urine Negative Negative    Leukocyte Esterase Urine Negative Negative    Bacteria Urine Few (A) None Seen /HPF    Mucus Urine Present (A) None Seen /LPF    Amorphous Crystals Urine Few (A) None Seen /HPF    RBC Urine 2 <=2 /HPF    WBC Urine 7 (H) <=5 /HPF    Squamous Epithelials Urine 4 (H) <=1 /HPF    Hyaline Casts Urine 266 (H) <=2 /LPF   CBC with platelets and differential   Result Value Ref Range    WBC Count 16.2 (H) 4.0 - 11.0 10e3/uL    RBC Count 4.92 3.80 - 5.20 10e6/uL    Hemoglobin 13.8 11.7 - 15.7 g/dL    Hematocrit 42.8 35.0 - 47.0 %    MCV 87 78 - 100 fL    MCH 28.0 26.5 - 33.0 pg    MCHC 32.2 31.5 - 36.5 g/dL    RDW 13.7 10.0 - 15.0 %    Platelet Count 264 150 - 450 10e3/uL    % Neutrophils 76 %    % Lymphocytes 15 %    % Monocytes 6 %    % Eosinophils 2 %    % Basophils 0 %    % Immature Granulocytes 1 %    NRBCs per 100 WBC 0 <1 /100    Absolute Neutrophils 12.4 (H) 1.6 - 8.3 10e3/uL    Absolute  Lymphocytes 2.4 0.8 - 5.3 10e3/uL    Absolute Monocytes 1.0 0.0 - 1.3 10e3/uL    Absolute Eosinophils 0.3 0.0 - 0.7 10e3/uL    Absolute Basophils 0.1 0.0 - 0.2 10e3/uL    Absolute Immature Granulocytes 0.2 <=0.4 10e3/uL    Absolute NRBCs 0.0 10e3/uL   Drug abuse screen 77 urine (FL, RH, SH)   Result Value Ref Range    Amphetamines Urine Screen Positive (A) Screen Negative    Barbituates Urine Screen Negative Screen Negative    Benzodiazepine Urine Screen Negative Screen Negative    Cannabinoids Urine Screen Positive (A) Screen Negative    Opiates Urine Screen Negative Screen Negative    PCP Urine Screen Negative Screen Negative    Cocaine Urine Screen Negative Screen Negative   Result Value Ref Range    Free T4 1.00 0.90 - 1.70 ng/dL           EKG:    Performed on 13-OCT-2022 21:09  Reviewed and interpreted as: HR 62 bpm. Normal sinus rhythm. No ST changes. When compared to EKG from 04/17/13 no changes.       I have independently reviewed and interpreted the EKG(s) documented above.        I, Ashley White, am serving as a scribe to document services personally performed by Dr. Ayah Sim based on my observation and the provider's statements to me. I, Ayah Sim MD attest that Ashley White is acting in a scribe capacity, has observed my performance of the services and has documented them in accordance with my direction.     Ayah Sim MD  10/14/22 0009

## 2022-10-14 NOTE — ED NOTES
"EMERGENCY DEPARTMENT SIGN OUT NOTE        ED COURSE AND MEDICAL DECISION MAKING  Patient was signed out to me by Dr Torres Perdomo at 0630  7:49 AM I spoke with [place], Dr. Britton.     In brief, Carly Pelayo is a 67 year old female who initially presented with lethargy and drug overdose. Patient's sister believes the patient took two baclofen, which is prescribed for the patient's pain. Patient's sister also reports giving the patient a \"sliver\" of buprenorphine at around 1630 on 10/13/2022. After being given this, sister reports the patient developed nausea and dry heaving.     At time of sign out, disposition was pending     Working diagnosis is that she took too much baclofen and Suboxone, however after 10 and half hours we tried to discharge her, ambulate her and she was hypoxic and she vomited.  She states that she does not feel normal.  She maintains a , nonfocal neurologic exam.    Is likely that this is related to medications, however when I spoke to the patient and her sister they state that this is not normal for the patient and they do not think it is the medications.  For this reason I added on a brain MRI and I do not feel that it would be safe to send her home at this time.  Plan is for observational admission.  I spoke to [facility] who accept the patient      FINAL IMPRESSION    1. Drug overdose of undetermined intent, initial encounter    2. Urinary tract infection without hematuria, site unspecified    3. Transient alteration of awareness    4. Acute respiratory failure with hypoxia (H)        ED MEDS  Medications   ondansetron (ZOFRAN) injection 4 mg (4 mg Intravenous Given 10/14/22 0621)       LAB  Labs Ordered and Resulted from Time of ED Arrival to Time of ED Departure   COMPREHENSIVE METABOLIC PANEL - Abnormal       Result Value    Sodium 139      Potassium 3.9      Chloride 103      Carbon Dioxide (CO2) 23      Anion Gap 13      Urea Nitrogen 7.1 (*)     " Creatinine 1.16 (*)     Calcium 9.2      Glucose 157 (*)     Alkaline Phosphatase 97      AST 36 (*)     ALT 20      Protein Total 7.5      Albumin 4.5      Bilirubin Total 0.3      GFR Estimate 51 (*)    TSH WITH FREE T4 REFLEX - Abnormal    TSH 4.23 (*)    ETHYL ALCOHOL LEVEL - Abnormal    Alcohol ethyl <0.01 (*)    ACETAMINOPHEN LEVEL - Abnormal    Acetaminophen <5.0 (*)    ROUTINE UA WITH MICROSCOPIC REFLEX TO CULTURE - Abnormal    Color Urine Yellow      Appearance Urine Turbid (*)     Glucose Urine Negative      Bilirubin Urine Negative      Ketones Urine Negative      Specific Gravity Urine 1.036 (*)     Blood Urine 0.2 mg/dL (*)     pH Urine 5.5      Protein Albumin Urine 300 (*)     Urobilinogen Urine 2.0 (*)     Nitrite Urine Negative      Leukocyte Esterase Urine Negative      Bacteria Urine Few (*)     Mucus Urine Present (*)     Amorphous Crystals Urine Few (*)     RBC Urine 2      WBC Urine 7 (*)     Squamous Epithelials Urine 4 (*)     Hyaline Casts Urine 266 (*)    CBC WITH PLATELETS AND DIFFERENTIAL - Abnormal    WBC Count 16.2 (*)     RBC Count 4.92      Hemoglobin 13.8      Hematocrit 42.8      MCV 87      MCH 28.0      MCHC 32.2      RDW 13.7      Platelet Count 264      % Neutrophils 76      % Lymphocytes 15      % Monocytes 6      % Eosinophils 2      % Basophils 0      % Immature Granulocytes 1      NRBCs per 100 WBC 0      Absolute Neutrophils 12.4 (*)     Absolute Lymphocytes 2.4      Absolute Monocytes 1.0      Absolute Eosinophils 0.3      Absolute Basophils 0.1      Absolute Immature Granulocytes 0.2      Absolute NRBCs 0.0     DRUG ABUSE SCREEN 77 URINE (FL, RH, SH) - Abnormal    Amphetamines Urine Screen Positive (*)     Barbituates Urine Screen Negative      Benzodiazepine Urine Screen Negative      Cannabinoids Urine Screen Positive (*)     Opiates Urine Screen Negative      PCP Urine Screen Negative      Cocaine Urine Screen Negative     LIPASE - Normal    Lipase 44     SALICYLATE  LEVEL - Normal    Salicylate <0.5     T4 FREE - Normal    Free T4 1.00     GLUCOSE BY METER - Normal    GLUCOSE BY METER POCT 99             RADIOLOGY    MR Brain w/o & w Contrast    (Results Pending)       DISCHARGE MEDS  New Prescriptions    NITROFURANTOIN MACROCRYSTAL-MONOHYDRATE (MACROBID) 100 MG CAPSULE    Take 1 capsule (100 mg) by mouth 2 times daily       Juanito Marshall MD  [facility] EMERGENCY DEPARTMENT  [address] [place] [id number] [phone]     Juanito Marshall MD  10/14/22 0750

## 2022-10-14 NOTE — TELEPHONE ENCOUNTER
"Patient and her sisters called.  The patient came over to one of the sisters [facility] to socialize.  They have not been drinking.    Patient is feeling weak, lethargic, clammy, pale and nauseous.  Patient is able to walk with help, she was able to talk with me, no breathing issues and knew where she was.  Sisters state she is not herself.  They stated \" she seems drugged up\".  They have been with her so they know she is not.    Care advise: get her to ED now.  Sister state they will bring her in now.  I explained if she passes out on the way to stop and call 911.    Karo Stoll RN   10/13/22 8:04 PM  [facility] Nurse Advisor    Reason for Disposition    Patient sounds very sick or weak to the triager    Additional Information    Negative: Shock suspected (e.g., cold/pale/clammy skin, too weak to stand, low BP, rapid pulse)    Negative: Sounds like a life-threatening emergency to the triager    Negative: [1] Nausea or vomiting AND [2] pregnancy < 20 weeks    Negative: Menstrual Period - Missed or Late (i.e., pregnancy suspected)    Negative: Heat exhaustion suspected (i.e., dehydration from heat exposure)    Negative: Motion sickness suspected (i.e., nausea with car, plane, boat, or train travel)    Negative: Anxiety or stress suspected (i.e., nausea with anxiety attacks or stressful situations)    Negative: Traumatic Brain Injury (TBI) suspected    Negative: Nausea (or Vomiting) in a cancer patient who is currently (or recently) receiving chemotherapy or radiation therapy, or cancer patient who has metastatic or end-stage cancer and is receiving palliative care    Negative: Vomiting occurs    Negative: Other symptom is present, see that guideline.  (e.g., chest pain, headache, dizziness, abdominal pain, colds, sore throat, etc.).    Negative: Unable to walk, or can only walk with assistance (e.g., requires support)    Negative: Difficulty breathing    Negative: [1] Insulin-dependent diabetes (Type " I) AND [2] glucose > 400 mg/dl (22 mmol/l)    Negative: [1] Drinking very little AND [2] dehydration suspected (e.g., no urine > 12 hours, very dry mouth, very lightheaded)    Protocols used: NAUSEA-A-AH

## 2022-10-14 NOTE — ED NOTES
Pt oxygen saturation 88%-89%, placed back on 2L via NC. Oxygen saturation went up to 98%, currently on 1L NC at 94%, provider updated.

## 2022-10-14 NOTE — ED NOTES
Decreased pts O2 and pt de-sated to 88% on room air. Pts O2 1 liter via nasal canula was reapplied. Will continue to monitor.

## 2022-10-14 NOTE — ED NOTES
Pt pulled out her IV. New IV was established. Pt tolerated well. Pt states she is nauseated and was unable to eat the crackers. No vomiting.

## 2022-10-14 NOTE — ED NOTES
"Pt was brought to the bathroom, pt reported feeling nauseous and dizzy while waling to the bathroom. Pt reports \"not feeling well\", provider updated  " Call to pt, \"the person you are calling isn't accepting messages right now\".  Message sent through the portal, can use Clotrimazole 3 or 7, call if sx persist after treatment.

## 2022-10-14 NOTE — DISCHARGE INSTRUCTIONS
Do not use medications that are prescribed to other people, especially pain medications, because those medications can interact with your pain medicine and cause profound medication reactions and can make you feel very sick.    Please do not take sudafed frequently because it is addictive and harmful. Use sudafed only when directed by your primary care doctor. It is not safe to take frequently even though it is available over the counter without a prescription. It can have effects on your body similar to meth, and is harmful.

## 2022-10-14 NOTE — ED NOTES
Report given to NNAMDI Her   Consent (Near Eyelid Margin)/Introductory Paragraph: The rationale for Mohs was explained to the patient and consent was obtained. The risks, benefits and alternatives to therapy were discussed in detail. Specifically, the risks of ectropion or eyelid deformity, infection, scarring, bleeding, prolonged wound healing, incomplete removal, allergy to anesthesia, nerve injury and recurrence were addressed. Prior to the procedure, the treatment site was clearly identified and confirmed by the patient. All components of Universal Protocol/PAUSE Rule completed.

## 2022-10-14 NOTE — ED NOTES
"EMERGENCY DEPARTMENT SIGN OUT NOTE        ED COURSE AND MEDICAL DECISION MAKING  Patient was signed out to me by Dr Ayah Sim at 12:27 AM.    In brief, Carly Pelayo is a 67 year old female who initially presented Drug overdose and lethargy. Patient took two of baclofen which is prescribed for her pain and a \"sliver\" of buprenorphine at around 1630. Patient was dozing off repeatedly during initial exam.    At time of sign out, disposition was pending discharge in the morning.    ED Course as of 10/14/22 0602   Thu Oct 13, 2022   2127 Pt floridly intoxicated appearing and falling asleep repeatedly during HPI aquisition, sister on phone admits to giving Carly a \"sliver\" of buprenorphine and paitent admits she took two of her baclofen instead of one tonight like usual for her chronic back pain. Neurovascularly intact except for very somnolent, no abdominal pain at all per her, just some slight nausea, she wasn't trying to treat any new pain with the medications tonight. EKG reassuring, UDS and EtOH  / acetaminophen/salicylate/TSH/LFTs pending and will clinically reassess for sobriety, family updated by NNAMDI Olson   0497 Pt with + cannabinoids and amphetamines, admits to using two instead of one baclofen and sister's buprenorphone, with intoxication. Will continue to await improved sobriety. TSH slightly elevated.  UA without substantial cells but with WBC 7 and a few squames. Possible early UTI, will begin macrobid.    6557 I spoke with [place] Poison control re: presentation who notes buprenorphine in patient who is not used to using it with double dose home baclofen would account for excessive somnolence, no use of tramadol/wellbutrin to trigger amphetamine +, she recommends observe 12h overnight for improvement in somnolence until mentaitng well, baclofen could lead to mild badycardia or hypotension (not in this case currently thankfully), when she appears sobered/improved then okay to discharge, if " respiratory compromise can administer naloxone if needed. Will reassess now.   Fri Oct 14, 2022   0004 Pt reassessed and still somewhat groggy appearing, admits to daily abuse of sudafed for over a year, then adds she did not realize it isn't okay to take sudafed every day. I spoke with one of her sisters at bedside, not the one who provided buprenorphine, who notes to patient that she has several times told patient she cannot use sudafed nearly daily or daily, and patient surprised to learn daily sudafed can be harmful or addictive. She denies SI, notes she did take double her baclofen and doesn't usually do that, and does reema to taking her other sister's suboxone simultaneously but vows not to do that in the future, aware it was dangerous and of poison control recommendation to watch overnight and discharge in the morning, other sister at bedside is happy to pick her up in the AM, adds patient did have history of using her sleeping medications above prescribed levels formerly, patient now promising to take her medications only as prescribed, lives alone, initially wanted to go home but now since she lives alone and s/p possible dangerous ingestion amenable to ED observation overnight.  Patient signed out to PM ED MD pending clinical reassessment in the AM and discharge in the morning after overdose.   0535 I examined the patient again at 6 AM and she is GCS 15.  She is comfortable plan for discharge with Macrobid     4:26 AM Rechecked and updated patient. Patient is easily aroused by voice. She is feeling fine and is ready to go home.    FINAL IMPRESSION    1. Drug overdose of undetermined intent, initial encounter    2. Urinary tract infection without hematuria, site unspecified        ED MEDS  Medications - No data to display    LAB  Labs Ordered and Resulted from Time of ED Arrival to Time of ED Departure   COMPREHENSIVE METABOLIC PANEL - Abnormal       Result Value    Sodium 139      Potassium 3.9       Chloride 103      Carbon Dioxide (CO2) 23      Anion Gap 13      Urea Nitrogen 7.1 (*)     Creatinine 1.16 (*)     Calcium 9.2      Glucose 157 (*)     Alkaline Phosphatase 97      AST 36 (*)     ALT 20      Protein Total 7.5      Albumin 4.5      Bilirubin Total 0.3      GFR Estimate 51 (*)    TSH WITH FREE T4 REFLEX - Abnormal    TSH 4.23 (*)    ETHYL ALCOHOL LEVEL - Abnormal    Alcohol ethyl <0.01 (*)    ACETAMINOPHEN LEVEL - Abnormal    Acetaminophen <5.0 (*)    ROUTINE UA WITH MICROSCOPIC REFLEX TO CULTURE - Abnormal    Color Urine Yellow      Appearance Urine Turbid (*)     Glucose Urine Negative      Bilirubin Urine Negative      Ketones Urine Negative      Specific Gravity Urine 1.036 (*)     Blood Urine 0.2 mg/dL (*)     pH Urine 5.5      Protein Albumin Urine 300 (*)     Urobilinogen Urine 2.0 (*)     Nitrite Urine Negative      Leukocyte Esterase Urine Negative      Bacteria Urine Few (*)     Mucus Urine Present (*)     Amorphous Crystals Urine Few (*)     RBC Urine 2      WBC Urine 7 (*)     Squamous Epithelials Urine 4 (*)     Hyaline Casts Urine 266 (*)    CBC WITH PLATELETS AND DIFFERENTIAL - Abnormal    WBC Count 16.2 (*)     RBC Count 4.92      Hemoglobin 13.8      Hematocrit 42.8      MCV 87      MCH 28.0      MCHC 32.2      RDW 13.7      Platelet Count 264      % Neutrophils 76      % Lymphocytes 15      % Monocytes 6      % Eosinophils 2      % Basophils 0      % Immature Granulocytes 1      NRBCs per 100 WBC 0      Absolute Neutrophils 12.4 (*)     Absolute Lymphocytes 2.4      Absolute Monocytes 1.0      Absolute Eosinophils 0.3      Absolute Basophils 0.1      Absolute Immature Granulocytes 0.2      Absolute NRBCs 0.0     DRUG ABUSE SCREEN 77 URINE (FL, RH, SH) - Abnormal    Amphetamines Urine Screen Positive (*)     Barbituates Urine Screen Negative      Benzodiazepine Urine Screen Negative      Cannabinoids Urine Screen Positive (*)     Opiates Urine Screen Negative      PCP Urine Screen  Negative      Cocaine Urine Screen Negative     LIPASE - Normal    Lipase 44     SALICYLATE LEVEL - Normal    Salicylate <0.5     T4 FREE - Normal    Free T4 1.00             RADIOLOGY    No orders to display       DISCHARGE MEDS  New Prescriptions    NITROFURANTOIN MACROCRYSTAL-MONOHYDRATE (MACROBID) 100 MG CAPSULE    Take 1 capsule (100 mg) by mouth 2 times daily         Ronald Perdomo MD  Emergency Medicine  [facility] EMERGENCY DEPARTMENT  [facility] [facility] [id number] [phone]     Ronald Perdomo MD  10/14/22 0602

## 2022-10-15 ENCOUNTER — APPOINTMENT (OUTPATIENT)
Dept: OCCUPATIONAL THERAPY | Facility: HOSPITAL | Age: 67
End: 2022-10-15
Attending: STUDENT IN AN ORGANIZED HEALTH CARE EDUCATION/TRAINING PROGRAM
Payer: COMMERCIAL

## 2022-10-15 VITALS
BODY MASS INDEX: 33.66 KG/M2 | DIASTOLIC BLOOD PRESSURE: 60 MMHG | HEART RATE: 53 BPM | RESPIRATION RATE: 18 BRPM | WEIGHT: 190 LBS | SYSTOLIC BLOOD PRESSURE: 90 MMHG | TEMPERATURE: 98.3 F | HEIGHT: 63 IN | OXYGEN SATURATION: 95 %

## 2022-10-15 LAB
ERYTHROCYTE [DISTWIDTH] IN BLOOD BY AUTOMATED COUNT: 13.6 % (ref 10–15)
HCT VFR BLD AUTO: 38.2 % (ref 35–47)
HGB BLD-MCNC: 12.4 G/DL (ref 11.7–15.7)
MCH RBC QN AUTO: 28.4 PG (ref 26.5–33)
MCHC RBC AUTO-ENTMCNC: 32.5 G/DL (ref 31.5–36.5)
MCV RBC AUTO: 87 FL (ref 78–100)
PLATELET # BLD AUTO: 209 10E3/UL (ref 150–450)
RBC # BLD AUTO: 4.37 10E6/UL (ref 3.8–5.2)
WBC # BLD AUTO: 9 10E3/UL (ref 4–11)

## 2022-10-15 PROCEDURE — 99217 PR OBSERVATION CARE DISCHARGE: CPT | Mod: GC | Performed by: STUDENT IN AN ORGANIZED HEALTH CARE EDUCATION/TRAINING PROGRAM

## 2022-10-15 PROCEDURE — 97166 OT EVAL MOD COMPLEX 45 MIN: CPT | Mod: GO

## 2022-10-15 PROCEDURE — 250N000013 HC RX MED GY IP 250 OP 250 PS 637: Performed by: STUDENT IN AN ORGANIZED HEALTH CARE EDUCATION/TRAINING PROGRAM

## 2022-10-15 PROCEDURE — 97535 SELF CARE MNGMENT TRAINING: CPT | Mod: GO

## 2022-10-15 PROCEDURE — 36415 COLL VENOUS BLD VENIPUNCTURE: CPT | Performed by: STUDENT IN AN ORGANIZED HEALTH CARE EDUCATION/TRAINING PROGRAM

## 2022-10-15 PROCEDURE — G0378 HOSPITAL OBSERVATION PER HR: HCPCS

## 2022-10-15 PROCEDURE — 85027 COMPLETE CBC AUTOMATED: CPT | Performed by: STUDENT IN AN ORGANIZED HEALTH CARE EDUCATION/TRAINING PROGRAM

## 2022-10-15 RX ADMIN — CITALOPRAM HYDROBROMIDE 20 MG: 20 TABLET ORAL at 07:57

## 2022-10-15 RX ADMIN — ROSUVASTATIN CALCIUM 40 MG: 40 TABLET, COATED ORAL at 07:57

## 2022-10-15 RX ADMIN — PANTOPRAZOLE SODIUM 40 MG: 40 TABLET, DELAYED RELEASE ORAL at 07:57

## 2022-10-15 RX ADMIN — LEVOTHYROXINE SODIUM 50 MCG: 0.03 TABLET ORAL at 07:57

## 2022-10-15 RX ADMIN — FLUTICASONE FUROATE AND VILANTEROL TRIFENATATE 1 PUFF: 200; 25 POWDER RESPIRATORY (INHALATION) at 10:36

## 2022-10-15 ASSESSMENT — ACTIVITIES OF DAILY LIVING (ADL)
DIFFICULTY_COMMUNICATING: NO
FALL_HISTORY_WITHIN_LAST_SIX_MONTHS: NO
ADLS_ACUITY_SCORE: 35
PREVIOUS_RESPONSIBILITIES: MEAL PREP;HOUSEKEEPING;LAUNDRY;SHOPPING;YARDWORK;MEDICATION MANAGEMENT;FINANCES;DRIVING
DOING_ERRANDS_INDEPENDENTLY_DIFFICULTY: NO
DRESSING/BATHING_DIFFICULTY: NO
ADLS_ACUITY_SCORE: 18
WEAR_GLASSES_OR_BLIND: NO
TOILETING_ISSUES: NO
ADLS_ACUITY_SCORE: 18
ADLS_ACUITY_SCORE: 35
CONCENTRATING,_REMEMBERING_OR_MAKING_DECISIONS_DIFFICULTY: NO
HEARING_DIFFICULTY_OR_DEAF: NO
ADLS_ACUITY_SCORE: 18
ADLS_ACUITY_SCORE: 18
DIFFICULTY_EATING/SWALLOWING: NO
WALKING_OR_CLIMBING_STAIRS_DIFFICULTY: NO
CHANGE_IN_FUNCTIONAL_STATUS_SINCE_ONSET_OF_CURRENT_ILLNESS/INJURY: NO

## 2022-10-15 NOTE — DISCHARGE SUMMARY
[facility]  Discharge Summary - Medicine & Pediatrics       Date of Admission:  10/13/2022  Date of Discharge:  10/15/2022  Discharging Provider: Shazia  Discharge Service: Hospitalist Service    Discharge Diagnoses   (T50.904A) Drug overdose of undetermined intent, initial encounter  (N39.0) Urinary tract infection without hematuria, site unspecified  (R40.4) Transient alteration of awareness  (J96.01) Acute respiratory failure with hypoxia (H)    Follow-ups Needed After Discharge   Follow-up Appointments     Follow-up and recommended labs and tests       Follow up with primary care provider, [facility], within 7   days for hospital follow- up.  No follow up labs or test are needed.             Unresulted Labs Ordered in the Past 30 Days of this Admission     No orders found from 9/13/2022 to 10/14/2022.      These results will be followed up by PCP    Discharge Disposition   Discharged to home  Condition at discharge: Stable    Hospital Course   Carly Pelayo was admitted on 10/13/2022 for altered mental status after taking sisters suboxone.  The following problems were addressed during her hospitalization:    Patient presented with altered mental status after taking her prescribed baclofen along with a 2mg tab of her sister's suboxone. Patient was having low back pain so that is why she took a dose of her sisters medication. She became lethargic so sister brought her to the ED. On the next day patient was awake and alert and without complaint. Transient altered mental status attributed to medication.     Transient altered mental status   Secondary to taking Baclofen with Suboxone, improved on day of discharge. Counseled on not taking medications that are not hers.   - Will follow up with PCP within the next week     +UDS  Tobacco dependence  UDS positive for meth but likely false positive due to pseudoephedrine daily usage. Smokes 1 ppw of cigarettes x 40-50 years.  UDS + for cannabinoids although she tells me this is rare. No other reported drug use. Declines nicotine patches/gum/lozenges.      Consultations This Hospital Stay   PHYSICAL THERAPY ADULT IP CONSULT  OCCUPATIONAL THERAPY ADULT IP CONSULT    Code Status   Full Code       The patient was discussed with Dr. Shazia Patton MD  Resident Service  [address] [address]  Phone: [phone]  Fax: [phone]  ______________________________________________________________________    Physical Exam   Vital Signs: Temp: 98.3  F (36.8  C) Temp src: Oral BP: 90/60 Pulse: 53   Resp: 18 SpO2: 95 % O2 Device: None (Room air) Oxygen Delivery: 2 LPM  Weight: 190 lbs 0 oz  Constitutional: awake, alert, cooperative, no apparent distress, and appears stated age  Eyes: Lids and lashes normal, pupils equal, round and reactive to light, extra ocular muscles intact, sclera clear, conjunctiva normal  ENT: Normocephalic, without obvious abnormality, atraumatic, sinuses nontender on palpation, external ears without lesions, oral pharynx with moist mucous membranes, tonsils without erythema or exudates, gums normal and good dentition.  Respiratory: No increased work of breathing, good air exchange, clear to auscultation bilaterally, no crackles or wheezing  Cardiovascular: Normal apical impulse, regular rate and rhythm, normal S1 and S2, no S3 or S4, and no murmur noted  GI: No scars, normal bowel sounds, soft, non-distended, non-tender, no masses palpated, no hepatosplenomegally  Skin: no bruising or bleeding  Musculoskeletal: There is no redness, warmth, or swelling of the joints.  Full range of motion noted.  Motor strength is 5 out of 5 all extremities bilaterally.  Tone is normal.  Neurologic: Awake, alert, oriented to name, place and time.  Cranial nerves II-XII are grossly intact.  Motor is 5 out of 5 bilaterally.  Cerebellar finger to nose, heel to  shin intact.  Sensory is intact.  Babinski down going, Romberg negative, and gait is normal.  Neuropsychiatric: General: normal, calm and normal eye contact      Primary Care Physician   [facility]    Discharge Orders      Reason for your hospital stay    Altered mental status due to medications     Follow-up and recommended labs and tests     Follow up with primary care provider, [facility], within 7 days for hospital follow- up.  No follow up labs or test are needed.     Activity    Your activity upon discharge: activity as tolerated     Diet    Follow this diet upon discharge: Orders Placed This Encounter      Regular Diet Adult       Significant Results and Procedures   Most Recent 3 CBC's:Recent Labs   Lab Test 10/15/22  1037 10/13/22  2116   WBC 9.0 16.2*   HGB 12.4 13.8   MCV 87 87    264     Most Recent 3 BMP's:Recent Labs   Lab Test 10/14/22  0617 10/13/22  2116   NA  --  139   POTASSIUM  --  3.9   CHLORIDE  --  103   CO2  --  23   BUN  --  7.1*   CR  --  1.16*   ANIONGAP  --  13   GINO  --  9.2   GLC 99 157*       Discharge Medications   Current Discharge Medication List      CONTINUE these medications which have NOT CHANGED    Details   albuterol (PROAIR HFA/PROVENTIL HFA/VENTOLIN HFA) 108 (90 Base) MCG/ACT inhaler Inhale 1-2 puffs into the lungs every 6 hours as needed      alendronate (FOSAMAX) 70 MG tablet Take 70 mg by mouth every 7 days Mondays      amLODIPine (NORVASC) 5 MG tablet Take 5 mg by mouth daily      baclofen (LIORESAL) 10 MG tablet Take 10 mg by mouth 3 times daily as needed      citalopram (CELEXA) 20 MG tablet Take 20 mg by mouth daily      fluticasone-vilanterol (BREO ELLIPTA) 200-25 MCG/INH inhaler Inhale 1 puff into the lungs daily      levothyroxine (SYNTHROID/LEVOTHROID) 50 MCG tablet Take 50 mcg by mouth daily      melatonin 1 MG TABS tablet Take 1 mg by mouth At Bedtime      pantoprazole (PROTONIX) 40 MG EC tablet Take 40 mg by mouth daily       pseudoePHEDrine (SUDAFED) 60 MG tablet Take 60 mg by mouth every 6 hours as needed      rosuvastatin (CRESTOR) 40 MG tablet Take 40 mg by mouth daily           Allergies   Allergies   Allergen Reactions     Erythromycin Unknown     Morphine Unknown     Oxycodone Unknown     Seafood Unknown

## 2022-10-15 NOTE — PLAN OF CARE
PRIMARY DIAGNOSIS: adverse drug reaction  OUTPATIENT/OBSERVATION GOALS TO BE MET BEFORE DISCHARGE:  1. Stable vital signs Yes  2. Tolerating diet:Yes  3. Pain controlled with oral pain medications:  Yes  4. Positive bowel sounds:  Yes  5. Voiding without difficulty:  Yes  6. Able to ambulate:  Yes  7. Provider specific discharge goals met:  No    Discharge Planner Nurse   Safe discharge environment identified: Yes  Barriers to discharge: Yes O2 needs       Entered by: Roberta Whitney RN 10/14/2022 7:02 PM     Please review provider order for any additional goals.   Nurse to notify provider when observation goals have been met and patient is ready for discharge.Goal Outcome Evaluation:

## 2022-10-15 NOTE — PLAN OF CARE
Goal Outcome Evaluation:      Plan of Care Reviewed With: patient           Pt discharge orders have been clarified with Dr Patton, Pt verbalized understanding that she is not going to be starting an ABX for discharge. Pt verbalized understanding of continue medications and follow-up appointment. Pt has her belongings.  Pt awaits transportation home.

## 2022-10-15 NOTE — PROGRESS NOTES
[facility]  OUTPATIENT OCCUPATIONAL THERAPY  EVALUATION  PLAN OF TREATMENT FOR OUTPATIENT REHABILITATION  (COMPLETE FOR INITIAL CLAIMS ONLY)  Patient's Last Name, First Name, M.I.  YOB: 1955  AletaCarly haque                          Provider's Name  [facility] Medical Record No.  [id number]                             Onset Date:  10/13/22   Start of Care Date:  (P) 10/15/22   Type:     ___PT   _X_OT   ___SLP Medical Diagnosis:  (P) weakness, LBP                    OT Diagnosis:  back pain Visits from SOC:  1     See note for plan of treatment, functional goals and certification details    I CERTIFY THE NEED FOR THESE SERVICES FURNISHED UNDER        THIS PLAN OF TREATMENT AND WHILE UNDER MY CARE     (Physician co-signature of this document indicates review and certification of the therapy plan).                           Cert       10/15/22 0833   Appointment Info   Signing Clinician's Name / Credentials (OT) Cherelle López OT   Quick Adds   Quick Adds Certification   Living Environment   People in Home alone   Current Living Arrangements apartment   Home Accessibility no concerns   Transportation Anticipated family or friend will provide   Living Environment Comments Was independent w/all ADLs and mobility   Self-Care   Usual Activity Tolerance good   Current Activity Tolerance moderate   Equipment Currently Used at Home none   Fall history within last six months no   Instrumental Activities of Daily Living (IADL)   Previous Responsibilities meal prep;housekeeping;laundry;shopping;yardwork;medication management;finances;driving   General Information   Onset of Illness/Injury or Date of Surgery 10/13/22   Referring Physician Dr Cormier   Patient/Family Therapy Goal Statement (OT) go home   Existing Precautions/Restrictions no known precautions/restrictions   Limitations/Impairments other (see comments)   Left Upper Extremity  (Weight-bearing Status) full weight-bearing (FWB)   Right Upper Extremity (Weight-bearing Status) full weight-bearing (FWB)   Left Lower Extremity (Weight-bearing Status) full weight-bearing (FWB)   Right Lower Extremity (Weight-bearing Status) full weight-bearing (FWB)   Cognitive Status Examination   Orientation Status orientation to person, place and time   Visual Perception   Visual Impairment/Limitations corrective lenses for reading   Sensory   Sensory Quick Adds sensation intact   Pain Assessment   Patient Currently in Pain No   Posture   Posture forward head position   Range of Motion Comprehensive   General Range of Motion no range of motion deficits identified   Strength Comprehensive (MMT)   General Manual Muscle Testing (MMT) Assessment no strength deficits identified   Muscle Tone Assessment   Muscle Tone Quick Adds No deficits were identified   Coordination   Upper Extremity Coordination No deficits were identified   Bed Mobility   Bed Mobility No deficits identified   Comment (Bed Mobility) Mod I   Transfers   Transfer Comments SBA   Balance   Balance Assessment standing balance: dynamic   Balance Comments fair   Activities of Daily Living   BADL Assessment/Intervention lower body dressing   Lower Body Dressing Assessment/Training   [place] Level (Lower Body Dressing) supervision   Clinical Impression   Criteria for Skilled Therapeutic Interventions Met (OT) Yes, treatment indicated   OT Diagnosis back pain   Influenced by the following impairments fatigue, weakness, decreased ADL skills   OT Problem List-Impairments impacting ADL balance;strength   Assessment of Occupational Performance 3-5 Performance Deficits   Identified Performance Deficits weakness, fatigue, decreased ADL skills   Planned Therapy Interventions (OT) ADL retraining   Clinical Decision Making Complexity (OT) moderate complexity   Anticipated Equipment Needs Upon Discharge (OT) other (see comments)  (none)   Risk & Benefits of  therapy have been explained care plan/treatment goals reviewed   OT Total Evaluation Time   OT Eval, Moderate Complexity Minutes (82042) 15   Therapy Certification   Medical Diagnosis weakness, LBP   Start of Care Date 10/15/22   Certification date from 10/15/22   Certification date to 10/15/22   OT Goals   Therapy Frequency (OT) Daily   OT Predicted Duration/Target Date for Goal Attainment 10/15/22   OT Goals Hygiene/Grooming;Lower Body Dressing;Toilet Transfer/Toileting   OT: Hygiene/Grooming supervision/stand-by assist   OT: Lower Body Dressing Supervision/stand-by assist   OT: Toilet Transfer/Toileting Supervision/stand-by assist   Interventions   Interventions Quick Adds Self-Care/Home Management   Self-Care/Home Management   Self-Care/Home Mgmt/ADL, Compensatory, Meal Prep Minutes (31033) 8   Symptoms Noted During/After Treatment (Meal Preparation/Planning Training) fatigue   Treatment Detail/Skilled Intervention transfers, G/H, LB dress, toileting   [place] Level (Grooming Training) stand-by assist   Assistance (Grooming Training) supervision   Assistive Device (Grooming Training) other (see comments)  (none)   [place] Level (Bathing Training) stand-by assist   Lower Body Dressing Training Assistance stand-by assist   Lower Body Dressing Training Assistance 1 person assist;set-up required;supervision;verbal cues   Lower Body Dressing Training Assistance - Assistive Device other (see comments)  (none)   [place] Level (Toilet Training) stand-by assist   Assistance (Toilet Training) supervision   Toilet Training Assistance - Assistive Device other (see comments)  (none)   OT Discharge Planning   OT Plan d/c OT, goals met   OT Discharge Recommendation (DC Rec) home with assist   OT Rationale for DC Rec pt is safe to return home w/family support   OT Brief overview of current status Pt is SBA w/her ADLs and mobility   Total Session Time   Timed Code Treatment Minutes 8   Total Session Time (sum of  timed and untimed services) 23

## 2022-10-15 NOTE — PLAN OF CARE
PRIMARY DIAGNOSIS: ADVERSE DRUG REACTION  OUTPATIENT/OBSERVATION GOALS TO BE MET BEFORE DISCHARGE:  1.  No return of symptoms after 12 hrs of observation: Yes  2.  No oropharngyeal edema or bronchospasm: Yes    3.  Stable vitals or return to baseline: No, requires oxygen to maintain saturation above 90%.    4.  Tolerate oral intake to maintain hydration: Yes    5.  Improved dyspnea or minimal O2 sats of 88% or previous baseline levels: No       SpO2: 96 %, O2 Device: Nasal cannula    6.  Return to near baseline physical activity: Yes    Discharge Planner Nurse   Safe discharge environment identified: Yes  Barriers to discharge: Yes, still requiring oxygen at 2LNC.    Pt bradycardic in 50's, asymptomatic.  BP elevated at 171/72, asymptomatic.         Entered by: Juan Spencer RN 10/15/2022 3:38 AM     Please review provider order for any additional goals.   Nurse to notify provider when observation goals have been met and patient is ready for discharge.

## 2022-10-15 NOTE — PLAN OF CARE
"  Problem: Plan of Care - These are the overarching goals to be used throughout the patient stay.    Goal: Plan of Care Review  Description: The Plan of Care Review/Shift note should be completed every shift.  The Outcome Evaluation is a brief statement about your assessment that the patient is improving, declining, or no change.  This information will be displayed automatically on your shift note.  Outcome: Met  Flowsheets (Taken 10/15/2022 1100)  Plan of Care Reviewed With: patient  Goal: Patient-Specific Goal (Individualized)  Description: You can add care plan individualizations to a care plan. Examples of Individualization might be:  \"Parent requests to be called daily at 9am for status\", \"I have a hard time hearing out of my right ear\", or \"Do not touch me to wake me up as it startles me\".  Outcome: Met  Goal: Absence of Hospital-Acquired Illness or Injury  Outcome: Met  Intervention: Identify and Manage Fall Risk  Recent Flowsheet Documentation  Taken 10/15/2022 0800 by Chika Mathews RN  Safety Promotion/Fall Prevention:   safety round/check completed   room organization consistent   room near nurse's station   patient and family education   nonskid shoes/slippers when out of bed   clutter free environment maintained  Intervention: Prevent Skin Injury  Recent Flowsheet Documentation  Taken 10/15/2022 0800 by Chika Mathews RN  Body Position: position changed independently  Goal: Optimal Comfort and Wellbeing  Outcome: Met  Goal: Readiness for Transition of Care  Outcome: Met  Flowsheets (Taken 10/15/2022 1100)  Anticipated Changes Related to Illness: none  Intervention: Mutually Develop Transition Plan  Recent Flowsheet Documentation  Taken 10/15/2022 1100 by Chika Mathews RN  Anticipated Changes Related to Illness: none     Problem: COPD (Chronic Obstructive Pulmonary Disease) Comorbidity  Goal: Maintenance of COPD Symptom Control  Outcome: Met     Problem: Pain Chronic (Persistent) (Comorbidity " Management)  Goal: Acceptable Pain Control and Functional Ability  Outcome: Met   Goal Outcome Evaluation:      Plan of Care Reviewed With: patient        Discharge in progress, Pt has had an uneventful shift. VSS. Pt denies pain. Pt was successful being weaned off O2.  Pt is independent in room.

## 2022-10-15 NOTE — PLAN OF CARE
Physical Therapy Discharge Summary    Reason for therapy discharge:    Patient/family request discontinuation of services.    Therapy recommendation(s):    Pt is indpendent with ambulation and does not required Inpatient PT Tabby Irby, PT DPT

## 2022-10-15 NOTE — PLAN OF CARE
Goal Outcome Evaluation:  PRIMARY DIAGNOSIS: Adverse drug reaction  OUTPATIENT/OBSERVATION GOALS TO BE MET BEFORE DISCHARGE:  1. Stable vital signs Yes  2. Tolerating diet:Yes  3. Pain controlled with oral pain medications:  Yes  4. Positive bowel sounds:  Yes  5. Voiding without difficulty:  Yes  6. Able to ambulate:  Yes  7. Provider specific discharge goals met:  No    Discharge Planner Nurse   Safe discharge environment identified: Yes  Barriers to discharge: Yes, remains on O2 via NC       Entered by: Lisseth Singh RN 10/14/2022 9:57 PM     Please review provider order for any additional goals.   Nurse to notify provider when observation goals have been met and patient is ready for discharge.

## 2022-10-15 NOTE — PLAN OF CARE
Occupational Therapy Discharge Summary    Reason for therapy discharge:    All goals and outcomes met, no further needs identified.    Progress towards therapy goal(s). See goals on Care Plan in [facility] electronic health record for goal details.  Goals met    Therapy recommendation(s):    No further therapy is recommended. Pt is at PLOF w/her ADLs.

## 2022-10-17 LAB
ATRIAL RATE - MUSE: 62 BPM
DIASTOLIC BLOOD PRESSURE - MUSE: NORMAL MMHG
INTERPRETATION ECG - MUSE: NORMAL
P AXIS - MUSE: 57 DEGREES
PR INTERVAL - MUSE: 160 MS
QRS DURATION - MUSE: 84 MS
QT - MUSE: 442 MS
QTC - MUSE: 448 MS
R AXIS - MUSE: 44 DEGREES
SYSTOLIC BLOOD PRESSURE - MUSE: NORMAL MMHG
T AXIS - MUSE: 69 DEGREES
VENTRICULAR RATE- MUSE: 62 BPM

## 2024-08-08 ENCOUNTER — HOSPITAL ENCOUNTER (EMERGENCY)
Facility: HOSPITAL | Age: 69
Discharge: HOME | End: 2024-08-08
Attending: EMERGENCY MEDICINE | Admitting: EMERGENCY MEDICINE
Payer: COMMERCIAL

## 2024-08-08 VITALS
TEMPERATURE: 96.9 F | HEART RATE: 56 BPM | WEIGHT: 218.3 LBS | RESPIRATION RATE: 22 BRPM | OXYGEN SATURATION: 95 % | BODY MASS INDEX: 38.67 KG/M2 | SYSTOLIC BLOOD PRESSURE: 174 MMHG | DIASTOLIC BLOOD PRESSURE: 89 MMHG

## 2024-08-08 DIAGNOSIS — M54.41 ACUTE BILATERAL LOW BACK PAIN WITH BILATERAL SCIATICA: ICD-10-CM

## 2024-08-08 DIAGNOSIS — M54.42 ACUTE BILATERAL LOW BACK PAIN WITH BILATERAL SCIATICA: ICD-10-CM

## 2024-08-08 PROBLEM — L65.9 HAIR LOSS: Status: ACTIVE | Noted: 2017-04-12

## 2024-08-08 PROBLEM — R53.82 CHRONIC FATIGUE: Status: ACTIVE | Noted: 2017-04-12

## 2024-08-08 PROBLEM — K59.09 CHRONIC CONSTIPATION: Status: ACTIVE | Noted: 2020-06-23

## 2024-08-08 PROBLEM — I10 HTN (HYPERTENSION): Status: ACTIVE | Noted: 2024-02-23

## 2024-08-08 PROBLEM — K57.90 DIVERTICULAR DISEASE: Status: ACTIVE | Noted: 2020-08-10

## 2024-08-08 PROBLEM — K64.9 HEMORRHOIDS: Status: ACTIVE | Noted: 2020-08-10

## 2024-08-08 PROBLEM — R35.89 POLYURIA: Status: ACTIVE | Noted: 2017-04-12

## 2024-08-08 PROBLEM — M85.89 OSTEOPENIA OF MULTIPLE SITES: Status: ACTIVE | Noted: 2021-06-10

## 2024-08-08 PROCEDURE — 96372 THER/PROPH/DIAG INJ SC/IM: CPT | Performed by: EMERGENCY MEDICINE

## 2024-08-08 PROCEDURE — 99284 EMERGENCY DEPT VISIT MOD MDM: CPT

## 2024-08-08 PROCEDURE — 250N000011 HC RX IP 250 OP 636: Performed by: EMERGENCY MEDICINE

## 2024-08-08 RX ORDER — OXYCODONE HYDROCHLORIDE 5 MG/1
5 TABLET ORAL EVERY 6 HOURS PRN
Qty: 12 TABLET | Refills: 0 | Status: SHIPPED | OUTPATIENT
Start: 2024-08-08 | End: 2024-08-12

## 2024-08-08 RX ORDER — KETOROLAC TROMETHAMINE 15 MG/ML
15 INJECTION, SOLUTION INTRAMUSCULAR; INTRAVENOUS ONCE
Status: COMPLETED | OUTPATIENT
Start: 2024-08-08 | End: 2024-08-08

## 2024-08-08 RX ADMIN — KETOROLAC TROMETHAMINE 15 MG: 15 INJECTION, SOLUTION INTRAMUSCULAR; INTRAVENOUS at 19:24

## 2024-08-08 ASSESSMENT — COLUMBIA-SUICIDE SEVERITY RATING SCALE - C-SSRS
1. IN THE PAST MONTH, HAVE YOU WISHED YOU WERE DEAD OR WISHED YOU COULD GO TO SLEEP AND NOT WAKE UP?: NO
2. HAVE YOU ACTUALLY HAD ANY THOUGHTS OF KILLING YOURSELF IN THE PAST MONTH?: NO
6. HAVE YOU EVER DONE ANYTHING, STARTED TO DO ANYTHING, OR PREPARED TO DO ANYTHING TO END YOUR LIFE?: NO

## 2024-08-08 ASSESSMENT — ACTIVITIES OF DAILY LIVING (ADL): ADLS_ACUITY_SCORE: 33

## 2024-08-08 NOTE — ED PROVIDER NOTES
EMERGENCY DEPARTMENT ENCOUNTER      NAME: Carly Pelayo  AGE: 69 year old female  YOB: 1955  MRN: [id number]  EVALUATION DATE & TIME: 8/8/2024  6:43 PM    PCP: Dianelys Albert Garnett    ED PROVIDER: Teresa Chavira DO      Chief Complaint   Patient presents with    Back Pain         FINAL IMPRESSION:  1. Acute bilateral low back pain with bilateral sciatica          ED COURSE & MEDICAL DECISION MAKING:    Pertinent Labs & Imaging studies reviewed. (See chart for details)  6:46 PM I met the patient and performed my initial interview and exam.  7:47 PM Patient's post-void residual was 18 mL.  7:51 PM Patient to be discharged by RN.    69 year old female presents to the Emergency Department for evaluation of worsening back pain.  Patient with a history of lumbar stenosis with neurogenic claudication, herniated nucleus pole ptosis on the left side, SI joint dysfunction.  She follows with physical medicine rehabilitation at [facility].  Recent visit 3 days ago.  Sounds like she has not been progressing with physical therapy.  Plans for an injection likely next week.  She orts worsening pain.  She now has pain on her right thigh as well but reports this has been for weeks.  She tells me she has been using Tylenol for her symptoms.  She called in today due to the severity of her pain and did not hear back, to call the triage nurse.  Patient reports 2 nights ago had an episode of incontinence and was sent here to rule out cauda equina.  Patient is ambulatory with a normal gait.  She is intact pulses.  She has 5 out of 5 strength bilaterally in proximal and distal muscle groups.  No perineal paresthesias.  She has some paresthesias to the left lateral thigh otherwise unremarkable.  She is not retaining urine.  Not consistent with cauda equina.  No fevers or chills to suggest epidural abscess or urinary tract infection.  Therefore, I do not think that any imaging of her back or abdomen are necessary today.   No dysuria.  Patient has been taking Tylenol at home.  Given a dose of Toradol here.  We did discuss continuing scheduled Tylenol and ibuprofen.  Will give a few tabs of oxycodone for home in the interim before her injection.    At the conclusion of the encounter I discussed the results of all of the tests and the disposition. The questions were answered. The patient or family acknowledged understanding and was agreeable with the care plan.     Medical Decision Making  Obtained supplemental history:Supplemental history obtained?: No  Reviewed external records: External records reviewed?: Documented in chart  Care impacted by chronic illness:Chronic Lung Disease, Hyperlipidemia, and Hypertension  Care significantly affected by social determinants of health:N/A  Did you consider but not order tests?: Work up considered but not performed and documented in chart, if applicable  Did you interpret images independently?: Independent interpretation of ECG and images noted in documentation, when applicable.  Consultation discussion with other provider:Did you involve another provider (consultant, , pharmacy, etc.)?: No  Discharge. I prescribed additional prescription strength medication(s) as charted. See documentation for any additional details.      MEDICATIONS GIVEN IN THE EMERGENCY:  Medications   ketorolac (TORADOL) injection 15 mg (15 mg Intramuscular $Given 8/8/24 1924)       NEW PRESCRIPTIONS STARTED AT TODAY'S ER VISIT  New Prescriptions    OXYCODONE (ROXICODONE) 5 MG TABLET    Take 1 tablet (5 mg) by mouth every 6 hours as needed for severe pain If pain is not improved with acetaminophen and ibuprofen.          =================================================================    HPI    Patient information was obtained from: patient     Use of : N/A        Carly Pelayo is a 69 year old female with a pertinent history of hypertension, hyperlipidemia, diverticular disease, osteopenia, and COPD  "who presents to this ED by walk-in for evaluation of back pain.    The patient presents with chronic, progressively worsening back pain at an 8-10/10 intensity that she describes as \"excruciating\" and \"takes my breath away.\" She sees a spine doctor and has been diagnosed with degenerative problems, pinched nerves, and spinal canal narrowing. Patient's pain began to radiate down her left leg recently as \"pounding and aching\" pain, and patient has noticed an area of numbness on her lateral left thigh. Her pain began to radiate down her right leg some weeks ago, but she has not developed any numbness in her right leg. Patient endorses difficulty walking secondary to the pain. She also endorses \"incredibly\" dry skin. She has tried physical therapy for a couple of month, but she has made no progress. Patient has been taking tylenol but is requesting stronger pain relief.    Patient last saw her spine doctor this week, and she is trying to schedule her first steroid shot in her back for 8/15. She called the office twice today, and spoke with a triage nurse who recommended today's ED visit due to patient's single episode of urinary incontinence a couple of nights ago while she was sleeping. Patient notes that she urinates \"constantly,\" including using the bathroom 6-7 times during the night.    Patient has a history of IBS. She has tolerated strong pain medications in the past. Patient is unsure why oxycodone is listed as an allergy in her medical chart, but she reports that morphine makes her very nauseous.    Patient denies fever, chills, dysuria, numbness or tingling when she wipes after using the bathroom, and any other symptoms or complaints at this time.     Per chart review, patient visited [facility] for a follow-up of her back pain. MRI conducted on 5/7/24 was remarkable for a left foraminal extrusion slightly distorting exiting nerve at L3-L4, bulging disc and mild to moderate right " foraminal narrowing at L4-L5, mild to moderate left foraminal narrowing with disc herniation at L5-S1, and mild lateral recess with spinal canal and foraminal narrowing at L2-L3 and L3-L4.      REVIEW OF SYSTEMS   Per HPI    PAST MEDICAL HISTORY:  History reviewed. No pertinent past medical history.    PAST SURGICAL HISTORY:  History reviewed. No pertinent surgical history.        CURRENT MEDICATIONS:    oxyCODONE (ROXICODONE) 5 MG tablet  albuterol (PROAIR HFA/PROVENTIL HFA/VENTOLIN HFA) 108 (90 Base) MCG/ACT inhaler  alendronate (FOSAMAX) 70 MG tablet  amLODIPine (NORVASC) 5 MG tablet  baclofen (LIORESAL) 10 MG tablet  citalopram (CELEXA) 20 MG tablet  fluticasone-vilanterol (BREO ELLIPTA) 200-25 MCG/INH inhaler  levothyroxine (SYNTHROID/LEVOTHROID) 50 MCG tablet  melatonin 1 MG TABS tablet  pantoprazole (PROTONIX) 40 MG EC tablet  pseudoePHEDrine (SUDAFED) 60 MG tablet  rosuvastatin (CRESTOR) 40 MG tablet         ALLERGIES:  Allergies   Allergen Reactions    Erythromycin Unknown    Morphine Unknown    Seafood Unknown       FAMILY HISTORY:  No family history on file.    SOCIAL HISTORY:   Social History     Socioeconomic History    Marital status: Single   Tobacco Use    Smoking status: Every Day     Types: Cigarettes     Social Determinants of Health     Financial Resource Strain: Low Risk  (5/23/2022)    Received from [facility]    Financial Resource Strain     Difficulty of Paying Living Expenses: 3   Food Insecurity: No Food Insecurity (5/23/2022)    Received from [facility]    Food Insecurity     Worried About Running Out of Food in the Last Year: 1   Transportation Needs: No Transportation Needs (5/23/2022)    Received from [facility]    Transportation Needs     Lack of Transportation (Medical): 1    Received from [facility]    Social Connections   Housing Stability: Low Risk   (5/23/2022)    Received from [facility] [facility]    Housing Stability     Unable to Pay for Housing in the Last Year: 1       VITALS:  BP (!) 146/63   Pulse 57   Temp 96.9  F (36.1  C) (Temporal)   Resp 22   Wt 99 kg (218 lb 4.8 oz)   SpO2 97%   BMI 38.67 kg/m      PHYSICAL EXAM    Physical Exam  Constitutional:       General: She is not in acute distress.  HENT:      Head: Normocephalic and atraumatic.      Mouth/Throat:      Pharynx: Oropharynx is clear.   Eyes:      Pupils: Pupils are equal, round, and reactive to light.   Cardiovascular:      Rate and Rhythm: Normal rate and regular rhythm.      Pulses: Normal pulses.           Dorsalis pedis pulses are 2+ on the right side and 2+ on the left side.      Heart sounds: Normal heart sounds.   Pulmonary:      Effort: Pulmonary effort is normal.      Breath sounds: Normal breath sounds.   Abdominal:      General: Abdomen is protuberant. Bowel sounds are normal.      Palpations: Abdomen is soft.      Tenderness: There is no abdominal tenderness.   Musculoskeletal:         General: Normal range of motion.   Skin:     General: Skin is warm and dry.      Capillary Refill: Capillary refill takes less than 2 seconds.   Neurological:      General: No focal deficit present.      Mental Status: She is alert and oriented to person, place, and time.      Comments: 5/5 strength in bilateral lower extremities in proximal and distal muscle groups. Decreased sensation to lateral left thigh. Otherwise, sensation intact.             I, Bertha Pierce, am serving as a scribe to document services personally performed by Dr. Teresa Chavira based on my observation and the provider's statements to me. I, Teresa Chavira, DO attest that Bertha Pierce is acting in a scribe capacity, has observed my performance of the services and has documented them in accordance with my direction.    Teresa Chavira, DO  Emergency Medicine  [facility]  [facility] EMERGENCY DEPARTMENT  [address] [place] [id number]  [phone]  Dept: [phone]       Teresa Chavira DO  08/08/24 2018

## 2024-08-08 NOTE — ED TRIAGE NOTES
Patient arrives for back pain that radiates down to leg. Started off on left side but now moving over to right as well. Numbness down to foot on left side. Has been seeing spine doctor, last seen on Wednesday. Plan to start shots for pain. Degenerative discs and pinched nerves. Took tylenol about 30 mins ago. Triage nurse line stated they were concerned for cauda equina and was told to come in for eval.

## 2024-08-09 NOTE — ED NOTES
Patient discharged at 2004 to home. Patient provided with all discharge paperwork and educational material. All questions answered to patient's satisfaction.

## 2024-08-09 NOTE — DISCHARGE INSTRUCTIONS
Your exam is not consistent with cauda equina today  Please continue to follow-up with your physician and physical therapy  I would recommend adding ibuprofen 400 mg every 6 hours to your Tylenol  Continue Tylenol 1000 mg every 8 hours  Roxicodone as needed for severe pain  Return if inability to walk, control your bowel or bladder or any other concern    You have been prescribed a narcotic pain medication that has risk for addiction with prolonged use, so please use sparingly.  Additionally, narcotics are medications that are sedating (will make you sleepy), so do not drive or operate machinery while taking this medication.  Avoid alcohol or other sedating medicines such as benzodiazepines while taking narcotics due to risk for increased sedation and difficulty breathing.      Narcotics will cause constipation.  If you need to take this medication please consider taking an over-the-counter stool softener and laxative, such as Senna Plus, to prevent constipation from developing.  Nausea is a side effect of narcotic use.  Possible additional side effects include vomiting, itching, and dizziness/lightheadedness.

## 2024-11-19 PROCEDURE — 99285 EMERGENCY DEPT VISIT HI MDM: CPT | Mod: 25

## 2024-11-19 RX ORDER — ONDANSETRON 2 MG/ML
4 INJECTION INTRAMUSCULAR; INTRAVENOUS ONCE
Status: COMPLETED | OUTPATIENT
Start: 2024-11-20 | End: 2024-11-20

## 2024-11-20 ENCOUNTER — HOSPITAL ENCOUNTER (EMERGENCY)
Facility: HOSPITAL | Age: 69
Discharge: HOME | End: 2024-11-20
Attending: EMERGENCY MEDICINE | Admitting: EMERGENCY MEDICINE
Payer: COMMERCIAL

## 2024-11-20 VITALS
SYSTOLIC BLOOD PRESSURE: 97 MMHG | TEMPERATURE: 98.1 F | HEIGHT: 62 IN | OXYGEN SATURATION: 94 % | HEART RATE: 71 BPM | BODY MASS INDEX: 41.04 KG/M2 | WEIGHT: 223 LBS | DIASTOLIC BLOOD PRESSURE: 63 MMHG | RESPIRATION RATE: 16 BRPM

## 2024-11-20 VITALS
HEART RATE: 93 BPM | BODY MASS INDEX: 38.79 KG/M2 | SYSTOLIC BLOOD PRESSURE: 137 MMHG | TEMPERATURE: 97 F | WEIGHT: 219 LBS | DIASTOLIC BLOOD PRESSURE: 95 MMHG | OXYGEN SATURATION: 93 % | RESPIRATION RATE: 20 BRPM

## 2024-11-20 DIAGNOSIS — K59.00 CONSTIPATION, UNSPECIFIED CONSTIPATION TYPE: ICD-10-CM

## 2024-11-20 DIAGNOSIS — R11.0 NAUSEA: ICD-10-CM

## 2024-11-20 LAB
ALBUMIN SERPL BCG-MCNC: 4.5 G/DL (ref 3.5–5.2)
ALP SERPL-CCNC: 93 U/L (ref 40–150)
ALT SERPL W P-5'-P-CCNC: 28 U/L (ref 0–50)
ANION GAP SERPL CALCULATED.3IONS-SCNC: 16 MMOL/L (ref 7–15)
AST SERPL W P-5'-P-CCNC: 36 U/L (ref 0–45)
BILIRUB SERPL-MCNC: 0.5 MG/DL
BUN SERPL-MCNC: 13.1 MG/DL (ref 8–23)
CALCIUM SERPL-MCNC: 9.6 MG/DL (ref 8.8–10.4)
CHLORIDE SERPL-SCNC: 104 MMOL/L (ref 98–107)
CREAT SERPL-MCNC: 1.63 MG/DL (ref 0.51–0.95)
EGFRCR SERPLBLD CKD-EPI 2021: 34 ML/MIN/1.73M2
ERYTHROCYTE [DISTWIDTH] IN BLOOD BY AUTOMATED COUNT: 12.6 % (ref 10–15)
GLUCOSE SERPL-MCNC: 127 MG/DL (ref 70–99)
HCO3 SERPL-SCNC: 22 MMOL/L (ref 22–29)
HCT VFR BLD AUTO: 44.8 % (ref 35–47)
HGB BLD-MCNC: 15.3 G/DL (ref 11.7–15.7)
LACTATE SERPL-SCNC: 2.6 MMOL/L (ref 0.7–2)
LIPASE SERPL-CCNC: 27 U/L (ref 13–60)
MCH RBC QN AUTO: 28.5 PG (ref 26.5–33)
MCHC RBC AUTO-ENTMCNC: 34.2 G/DL (ref 31.5–36.5)
MCV RBC AUTO: 84 FL (ref 78–100)
PLATELET # BLD AUTO: 308 10E3/UL (ref 150–450)
POTASSIUM SERPL-SCNC: 4 MMOL/L (ref 3.4–5.3)
PROT SERPL-MCNC: 8.1 G/DL (ref 6.4–8.3)
RBC # BLD AUTO: 5.36 10E6/UL (ref 3.8–5.2)
SODIUM SERPL-SCNC: 142 MMOL/L (ref 135–145)
WBC # BLD AUTO: 15.3 10E3/UL (ref 4–11)

## 2024-11-20 PROCEDURE — 83605 ASSAY OF LACTIC ACID: CPT | Performed by: EMERGENCY MEDICINE

## 2024-11-20 PROCEDURE — 83690 ASSAY OF LIPASE: CPT | Performed by: EMERGENCY MEDICINE

## 2024-11-20 PROCEDURE — 96361 HYDRATE IV INFUSION ADD-ON: CPT

## 2024-11-20 PROCEDURE — 36415 COLL VENOUS BLD VENIPUNCTURE: CPT | Performed by: EMERGENCY MEDICINE

## 2024-11-20 PROCEDURE — 96374 THER/PROPH/DIAG INJ IV PUSH: CPT

## 2024-11-20 PROCEDURE — 85027 COMPLETE CBC AUTOMATED: CPT | Performed by: EMERGENCY MEDICINE

## 2024-11-20 PROCEDURE — 250N000011 HC RX IP 250 OP 636: Performed by: EMERGENCY MEDICINE

## 2024-11-20 PROCEDURE — 250N000013 HC RX MED GY IP 250 OP 250 PS 637: Performed by: EMERGENCY MEDICINE

## 2024-11-20 PROCEDURE — 258N000003 HC RX IP 258 OP 636: Performed by: EMERGENCY MEDICINE

## 2024-11-20 PROCEDURE — 80053 COMPREHEN METABOLIC PANEL: CPT | Performed by: EMERGENCY MEDICINE

## 2024-11-20 PROCEDURE — 99283 EMERGENCY DEPT VISIT LOW MDM: CPT

## 2024-11-20 PROCEDURE — 96375 TX/PRO/DX INJ NEW DRUG ADDON: CPT

## 2024-11-20 RX ORDER — MAGNESIUM CARB/ALUMINUM HYDROX 105-160MG
296 TABLET,CHEWABLE ORAL ONCE
Qty: 296 ML | Refills: 0 | Status: SHIPPED | OUTPATIENT
Start: 2024-11-20 | End: 2024-11-20

## 2024-11-20 RX ORDER — ONDANSETRON 4 MG/1
4 TABLET, ORALLY DISINTEGRATING ORAL EVERY 6 HOURS PRN
Qty: 20 TABLET | Refills: 0 | Status: SHIPPED | OUTPATIENT
Start: 2024-11-20 | End: 2024-11-27

## 2024-11-20 RX ORDER — SODIUM PHOSPHATE,MONO-DIBASIC 19G-7G/197
1 ENEMA (ML) RECTAL ONCE
Qty: 230 ML | Refills: 0 | Status: SHIPPED | OUTPATIENT
Start: 2024-11-20 | End: 2024-11-20

## 2024-11-20 RX ORDER — POLYETHYLENE GLYCOL 3350 17 G/17G
1 POWDER, FOR SOLUTION ORAL DAILY
Qty: 510 G | Refills: 0 | Status: SHIPPED | OUTPATIENT
Start: 2024-11-20 | End: 2024-12-20

## 2024-11-20 RX ADMIN — ONDANSETRON 4 MG: 2 INJECTION INTRAMUSCULAR; INTRAVENOUS at 00:32

## 2024-11-20 RX ADMIN — DOCUSATE SODIUM 226 ML: 50 LIQUID ORAL at 02:41

## 2024-11-20 RX ADMIN — SODIUM CHLORIDE 1000 ML: 9 INJECTION, SOLUTION INTRAVENOUS at 00:34

## 2024-11-20 RX ADMIN — FAMOTIDINE 20 MG: 10 INJECTION, SOLUTION INTRAVENOUS at 00:34

## 2024-11-20 ASSESSMENT — COLUMBIA-SUICIDE SEVERITY RATING SCALE - C-SSRS
2. HAVE YOU ACTUALLY HAD ANY THOUGHTS OF KILLING YOURSELF IN THE PAST MONTH?: NO
6. HAVE YOU EVER DONE ANYTHING, STARTED TO DO ANYTHING, OR PREPARED TO DO ANYTHING TO END YOUR LIFE?: NO
1. IN THE PAST MONTH, HAVE YOU WISHED YOU WERE DEAD OR WISHED YOU COULD GO TO SLEEP AND NOT WAKE UP?: NO

## 2024-11-20 ASSESSMENT — ACTIVITIES OF DAILY LIVING (ADL)
ADLS_ACUITY_SCORE: 0

## 2024-11-20 NOTE — DISCHARGE INSTRUCTIONS
You were seen in the Emergency Department today for abdominal pain and nausea.    Like we talked about, your CT scan showed that you had a large amount of stool in your colon.  Your kidney function makes me concerned that you might of gotten a bit dehydrated so you should make sure that you are drinking plenty of fluids and follow-up with your doctor for recheck of your labs.  For your constipation, I would recommend that you start taking MiraLAX every day to help get your bowels regular again.  Also make sure that you are drinking plenty of fluids and including fiber in your diet.  You can use the Zofran as needed for nausea.      Please return to the ER if you experience uncontrolled pain, inability to keep fluids down and/or for any other new or concerning symptoms, otherwise please follow up with your primary doctor in 3-5 days for recheck.     Below is some information you might find useful.     Thank you for choosing [facility]. It was a pleasure taking care of you today!  - Dr. Marlene Posada

## 2024-11-20 NOTE — ED PROVIDER NOTES
EMERGENCY DEPARTMENT ENCOUNTER      NAME: Carly Pelayo  AGE: 69 year old female  YOB: 1955  MRN: [id number]  EVALUATION DATE & TIME: No admission date for patient encounter.    PCP: Dianelys Albert    ED PROVIDER: Ayah Sim M.D.      Chief Complaint   Patient presents with    Constipation         FINAL IMPRESSION:  1. Constipation, unspecified constipation type          ED COURSE & MEDICAL DECISION MAKING:    ED Course as of 11/20/24 1703   Wed Nov 20, 2024   1118 Pt here bounce back after overnight in ED for constipation with normal labs and CT with some constipation and feeling like she is still straining to have a bowel movement, took no medications today prior to coming to the ED, has a PMD who she notes she can call, notes she thought about calling her PMD today about her ED visit for constipation and to ask if she shoudl use any medications for the constipation but instead returned to the ED to ask if she should take anything at home for constipation, and with h/o recurrent constipation, ok with plans to  Rx miralax, mag citrate and enema from preferred pharmacy to use at home today and with plans to call her PMD today so her PMD can ensure she feels improved after bowel movement and can follow up with her serially. Patient discharged after being provided with extensive anticipatory guidance and given return precautions, importance of PMD follow-up emphasized.        Pertinent Labs & Imaging studies reviewed. (See chart for details)    Medical Decision Making  Obtained supplemental history:Supplemental history obtained?: No  Reviewed external records: External records reviewed?: Documented in chart  Care impacted by chronic illness:Chronic Lung Disease, Hyperlipidemia, Hypertension, and Other: GERD, hypothyroidism  Did you consider but not order tests?: Work up considered but not performed and documented in chart, if applicable  Did you interpret images independently?:  Independent interpretation of ECG and images noted in documentation, when applicable.  Consultation discussion with other provider:Did you involve another provider (consultant, , pharmacy, etc.)?: No  Discharge. I prescribed additional prescription strength medication(s) as charted. See documentation for any additional details.    MIPS: Not Applicable    11:07 AM I met the patient and performed my initial interview and exam. We discussed plan for discharge and all questions were answered.    At the conclusion of the encounter I discussed the results of all of the tests and the disposition. The questions were answered. The patient or family acknowledged understanding and was agreeable with the care plan.     MEDICATIONS GIVEN IN THE EMERGENCY:  Medications - No data to display    NEW PRESCRIPTIONS STARTED AT TODAY'S ER VISIT  Discharge Medication List as of 11/20/2024 12:12 PM        START taking these medications    Details   magnesium citrate 1.745 GM/30ML solution Take 296 mLs by mouth once for 1 dose., Disp-296 mL, R-0, E-Prescribe      polyethylene glycol (MIRALAX) 17 GM/Dose powder Take 17 g (1 Capful) by mouth daily., Disp-510 g, R-0, E-Prescribe      Sodium Phosphates (FLEET SALINE ENEMA) 7-19 GM/197ML ENEM Place 1 enema rectally once for 1 dose., Disp-230 mL, R-0, E-Prescribe                =================================================================    HPI      Carly Pelayo is a 69 year old female with PMHx of hyperlipidemia, hypertension, COPD, who presents to the ED today via private car with self for evaluation of constipation.    Last night, the patient presented to the ER for ongoing constipation after it didn't resolve with use of magnesium citrate. She had an enema performed with production of liquid stool, then being discharged home. Since then, she still feels constipated with production of some liquid stool but no significant bowel movement yet. Accompanying this is 8/10 abdominal  "pain that flares to 10/10 pain when spasming, and she describes it as \"contraction-like.\" She has used no medications today.    The patient has a history of 4 prior ER visits for constipation.    Per chart review, the patient presented to [facility] ER on 20-Nov-2024 for evaluation of constipation. CT imaging unremarkable. Labs normal. Enema performed and prescribed. Patient discharged after improving. Prescribed Zofran.       REVIEW OF SYSTEMS   All other systems reviewed and are negative except as noted above in HPI.    PAST MEDICAL HISTORY:  History reviewed. No pertinent past medical history.    PAST SURGICAL HISTORY:  History reviewed. No pertinent surgical history.    CURRENT MEDICATIONS:    magnesium citrate 1.745 GM/30ML solution  polyethylene glycol (MIRALAX) 17 GM/Dose powder  Sodium Phosphates (FLEET SALINE ENEMA) 7-19 GM/197ML ENEM  albuterol (PROAIR HFA/PROVENTIL HFA/VENTOLIN HFA) 108 (90 Base) MCG/ACT inhaler  alendronate (FOSAMAX) 70 MG tablet  amLODIPine (NORVASC) 5 MG tablet  baclofen (LIORESAL) 10 MG tablet  citalopram (CELEXA) 20 MG tablet  fluticasone-vilanterol (BREO ELLIPTA) 200-25 MCG/INH inhaler  levothyroxine (SYNTHROID/LEVOTHROID) 50 MCG tablet  melatonin 1 MG TABS tablet  ondansetron (ZOFRAN ODT) 4 MG ODT tab  pantoprazole (PROTONIX) 40 MG EC tablet  pseudoePHEDrine (SUDAFED) 60 MG tablet  rosuvastatin (CRESTOR) 40 MG tablet        ALLERGIES:  Allergies   Allergen Reactions    Erythromycin Unknown    Morphine Unknown    Seafood Unknown       FAMILY HISTORY:  History reviewed. No pertinent family history.    SOCIAL HISTORY:   Social History     Socioeconomic History    Marital status: Single   Tobacco Use    Smoking status: Every Day     Types: Cigarettes     Social Drivers of Health     Financial Resource Strain: Low Risk  (5/23/2022)    Received from [facility]    Financial Resource Strain     Difficulty of Paying Living Expenses: 3   Food Insecurity: No " "Food Insecurity (5/23/2022)    Received from [facility]    Food Insecurity     Worried About Running Out of Food in the Last Year: 1   Transportation Needs: No Transportation Needs (5/23/2022)    Received from [facility]    Transportation Needs     Lack of Transportation (Medical): 1    Received from [facility]    Social Connections   Housing Stability: Low Risk  (5/23/2022)    Received from [facility]    Housing Stability     Unable to Pay for Housing in the Last Year: 1       VITALS:  Patient Vitals for the past 24 hrs:   BP Temp Temp src Pulse Resp SpO2 Height Weight   11/20/24 1216 97/63 -- -- 71 16 94 % -- --   11/20/24 1107 114/58 98.1  F (36.7  C) Oral 80 16 97 % 1.575 m (5' 2\") 101.2 kg (223 lb)       PHYSICAL EXAM    GENERAL: Awake, alert.  In no acute distress.   HEENT: Normocephalic, atraumatic.  Pupils equal, round and reactive.  Conjunctiva normal.  EOMI.  NECK: No stridor or apparent deformity.  PULMONARY: Symmetrical breath sounds without distress.  Lungs clear to auscultation bilaterally without wheezes, rhonchi or rales.  CARDIO: Regular rate and rhythm.  No significant murmur, rub or gallop.  Radial pulses strong and symmetrical.  ABDOMINAL: Abdomen soft, non-distended and non-tender to palpation.  No CVAT, no palpable hepatosplenomegaly.  EXTREMITIES: No lower extremity swelling or edema.    NEURO: Alert and oriented to person, place and time.  Cranial nerves grossly intact.  No focal motor deficit.  PSYCH: Normal mood and affect  SKIN: No rashes        I, Ras Cid, am serving as a scribe to document services personally performed by Dr. Ayah Sim based on my observation and the provider's statements to me. I, Ayah Sim MD attest that Ras Cid is acting in a scribe capacity, has observed my performance of the services and has documented them in " accordance with my direction.       Ayah Sim MD  11/20/24 8272

## 2024-11-20 NOTE — ED PROVIDER NOTES
EMERGENCY DEPARTMENT ENCOUNTER      NAME: Carly Pelayo  AGE: 69 year old female  YOB: 1955  EVALUATION DATE & TIME: No admission date for patient encounter.    ED PROVIDER: Marlene Posada MD    Chief Complaint   Patient presents with    Abdominal Pain    Constipation       FINAL IMPRESSION  1. Constipation, unspecified constipation type    2. Nausea        MEDICAL DECISION MAKING   Carly Pelayo is a 69 year old female who presents for evaluation of generalized abdominal pain, bloating, nausea.  Records reviewed.  Patient has a history of GERD, COPD, hypothyroidism, diverticular disease, hemorrhoids, cholecystectomy complicated by ileus, radical hysterectomy.  She was most recently seen in clinic by pain team on 10/28/2024 for management of low back pain.  Reviewed that note.  Today she presents with sister for evaluation of symptoms that have been ongoing for the last 4 days.  Patient reports that she has not been able to have a bowel movement and is feeling very bloated and nauseous.  She also has not been passing gas.  She tried using mag citrate and has continued her stool softeners without relief.  Vitals on arrival notable for low-grade tachycardia and slightly elevated blood pressure.    I considered a broad differential including but not limited to GERD, diverticulitis, colitis, small bowel obstruction/ileus, perforation, mesenteric ischemia, IBS, IBD, constipation. Also considered ovarian torsion, ovarian cyst, TOA, PID, and pain secondary to vagina/cervical infection but do feel gynecologic/ovarian etiology less likely.  Discussed options for workup with the patient. We agreed on plan for labs, CT A/P, and management of symptoms with fluids, IV pepcid, zofran.      ED Course as of 11/20/24 0941   Wed Nov 20, 2024   0113 Comprehensive metabolic panel(!)  CMP notable for creatinine of 1.63, appears to be uptrending from baseline most recent, although this was 2 years ago.  No  other significant electrolyte derangement or evidence of hepatobiliary disease.   0113 Lactic Acid(!): 2.6  Lactate elevated, concerning for infectious/inflammatory process or potentially, ischemia.  Will continue IV fluids for now.   0114 CBC (+ platelets, no diff)(!)  CBC notable for leukocytosis with WBC of 15.3, concerning for infectious/inflammatory process or potentially stress/demargination.  No acute anemia.   0114 Lipase: 27  Lipase within normal limits, symptoms less likely related to acute pancreatitis.     0156 CT Abdomen Pelvis w/o Contrast  I reviewed results of CT scan which revealed a large amount of stool in the rectum and colon.  I do suspect this is etiology of patient's symptoms.     I rechecked the patient multiple times.  She had improvement in symptoms with initial interventions but did continue to complain of some rectal pain.  I reviewed results of labs and imaging and discussed options for management of constipation.  Patient was very interested in an enema here, states that she had 1 in the past and it seemed very helpful.    Patient was given a pink lady enema and was able to have a bowel movement here.  On reevaluation, she reported feeling much improved with regards to both pain and nausea.  She was eager to go home and not at all interested in staying in the hospital for any further interventions or workup.  She is tolerating p.o. here and is hemodynamically stable so I do believe this is reasonable.  Although lactate was slightly elevated, I suspect this will have normalized after fluids and patient did not wish to wait for repeat.  I encouraged her to continue to stay well-hydrated and follow-up with her PCP for recheck of labs and in particular, kidney function.  Also recommended that she start using MiraLAX daily for at least the next 2 weeks.  Will send with a prescription for Zofran to take as needed for nausea.  She plans to  MiraLAX over-the-counter.    At the end of the  encounter, we reviewed the results, potential diagnoses, as well as return precautions and recommendations for follow up. I instructed Ms. Pelayo to return to the emergency department immediately if she develops any new or worsening symptoms and provided additional verbal discharge instructions. Ms. Pelayo expressed understanding and agreement with this plan of care, her questions were answered, and she was discharged in stable condition.      Considerations in Medical Decision Making  History:  Obtained supplemental history: Supplemental history obtained?: Family Member/Significant Other  Reviewed external records: External records reviewed?: Documented in chart  Care impacted by chronic illness: Chronic Pain, Diabetes, and Hypertension    Work Up:  In additional to work up documented, I considered the following work up: Documented in chart, if applicable.  Chart documentation includes differential considered and any EKGs or imaging independently interpreted by provider, where specified.    External consultation:  Discussion of management with another provider: Documented in chart, if applicable    Disposition considerations: Discharge. I prescribed additional prescription strength medication(s) as charted. I considered admission, but discharged the patient after share decision making conversation.    MIPS Documentation: Not Applicable       ED COURSE  11:51 AM I met with the patient, obtained history, performed an initial exam, and discussed options and plan for diagnostics and treatment here in the ED.   1:11 AM I rechecked and updated the patient on results.    1:58 AM I rechecked and updated the patient on results.   2:38 AM I rechecked the patient.   4:20 AM I rechecked the patient after enema.      MEDICATIONS GIVEN IN THE ED  Medications   sodium chloride 0.9% BOLUS 1,000 mL (0 mLs Intravenous Stopped 11/20/24 0135)   ondansetron (ZOFRAN) injection 4 mg (4 mg Intravenous $Given 11/20/24 0032)  "  famotidine (PEPCID) injection 20 mg (20 mg Intravenous $Given 11/20/24 0034)   Enema Compound (docusate/mineral oil/NaPhos) NO MAG CIT PREMIX (226 mLs Rectal $Given 11/20/24 0241)       NEW PRESCRIPTIONS STARTED AT TODAY'S VISIT  Discharge Medication List as of 11/20/2024  4:37 AM        START taking these medications    Details   ondansetron (ZOFRAN ODT) 4 MG ODT tab Take 1 tablet (4 mg) by mouth every 6 hours as needed., Disp-20 tablet, R-0, E-Prescribe                =================================================================    HPI:    Use of : N/A      Carly Pelayo is a 69 year old female who presents with constipation.    The patient has not been able to have a bowel movement for the last 4 days and has not been passing gas either. Since arriving in the ED she tried to have 2 bowel movements that felt \"like birthing a baby\". She did try taking magnesium citrate recently which did not help. She takes a stool softener at her baseline. She also endorses generalized upper abdominal pain, nausea, and diaphoresis. She has not been eating very well recently because her back pain limits her from going to the grocery store. She has been having dark urine recently but no other urinary symptoms. She has an abdominal surgical history of cholecystectomy and radical hysterectomy.     She denies fever or any other complaints at this time.       RELEVANT HISTORY, MEDICATIONS, & ALLERGIES   Past medical history, surgical history, family history, medications, and allergies reviewed and pertinent noted in HPI.    REVIEW OF SYSTEMS:  A complete review of systems was performed with pertinent positives and negatives noted in the HPI.     PHYSICAL EXAM:    Vitals: BP (!) 137/95   Pulse 93   Temp 97  F (36.1  C) (Temporal)   Resp 20   Wt 99.3 kg (219 lb)   SpO2 93%   BMI 38.79 kg/m     General: Alert and interactive, comfortable appearing.  HENT: Atraumatic. Full AROM of neck. MMM.  Cardiovascular: " Regular rate and rhythm.   Chest/Pulmonary: Normal work of breathing. Speaking in complete sentences. Lungs CTAB. No chest wall tenderness or deformities.  Abdomen: Soft, slightly distended. Mild diffuse TTP without guarding or rebound.  Extremities: Normal AROM of all major joints.  Skin: Warm and dry. Normal skin color.   Neuro: Speech clear. CNs grossly intact. Moves all extremities spontaneously.   Psych: Normal affect/mood, cooperative, memory appropriate.      LAB  Labs Ordered and Resulted from Time of ED Arrival to Time of ED Departure   LACTIC ACID WHOLE BLOOD WITH 1X REPEAT IN 2 HR WHEN >2 - Abnormal       Result Value    Lactic Acid, Initial 2.6 (*)    COMPREHENSIVE METABOLIC PANEL - Abnormal    Sodium 142      Potassium 4.0      Carbon Dioxide (CO2) 22      Anion Gap 16 (*)     Urea Nitrogen 13.1      Creatinine 1.63 (*)     GFR Estimate 34 (*)     Calcium 9.6      Chloride 104      Glucose 127 (*)     Alkaline Phosphatase 93      AST 36      ALT 28      Protein Total 8.1      Albumin 4.5      Bilirubin Total 0.5     CBC WITH PLATELETS - Abnormal    WBC Count 15.3 (*)     RBC Count 5.36 (*)     Hemoglobin 15.3      Hematocrit 44.8      MCV 84      MCH 28.5      MCHC 34.2      RDW 12.6      Platelet Count 308     LIPASE - Normal    Lipase 27         RADIOLOGY  CT Abdomen Pelvis w/o Contrast   Final Result   IMPRESSION:    1.  Large amount of stool in the rectum and a moderate amount of gas and stool in the remainder of the colon. No bowel obstruction or inflammation.   2.  Colonic diverticula without acute diverticulitis.                 I, Taiwo Lamar, am serving as a scribe to document services personally performed by Dr. Marlene Posada based on my observation and the provider's statements to me. I, Marlene Posada MD attest that Taiwo Lamar is acting in a scribe capacity, has observed my performance of the services and has documented them in accordance with my direction.    Marlene Posada M.D.  Emergency  Medicine  [facility] EMERGENCY DEPARTMENT  [facility] [facility] [id number] [phone]  Dept: [phone]      Marlene Posada MD  11/20/24 0913

## 2024-11-20 NOTE — ED TRIAGE NOTES
Pt was here yesterday for abdominal pain. She is constipated with some liquid stool being expelled. Has not tried prescribed meds today.   Triage Assessment (Adult)       Row Name 11/20/24 1100          Triage Assessment    Airway WDL WDL        Respiratory WDL    Respiratory WDL WDL        Skin Circulation/Temperature WDL    Skin Circulation/Temperature WDL WDL        Cardiac WDL    Cardiac WDL WDL        Peripheral/Neurovascular WDL    Peripheral Neurovascular WDL WDL        Cognitive/Neuro/Behavioral WDL    Cognitive/Neuro/Behavioral WDL WDL

## 2024-11-20 NOTE — ED TRIAGE NOTES
Pt reports she has been unable to have a bowel movement or pass gas for several days.  Pt denies emesis.  Pt has been able to drink some fluids.  Pt has previous hx of bowel obstruction.  Pt reports trying to take magnesium citrate today without results.       Triage Assessment (Adult)       Row Name 11/19/24 5539          Triage Assessment    Airway WDL WDL        Respiratory WDL    Respiratory WDL WDL        Skin Circulation/Temperature WDL    Skin Circulation/Temperature WDL WDL        Cardiac WDL    Cardiac WDL WDL        Peripheral/Neurovascular WDL    Peripheral Neurovascular WDL WDL        Cognitive/Neuro/Behavioral WDL    Cognitive/Neuro/Behavioral WDL WDL

## 2025-05-26 ENCOUNTER — APPOINTMENT (OUTPATIENT)
Dept: RADIOLOGY | Facility: HOSPITAL | Age: 70
End: 2025-05-26
Attending: STUDENT IN AN ORGANIZED HEALTH CARE EDUCATION/TRAINING PROGRAM
Payer: COMMERCIAL

## 2025-05-26 ENCOUNTER — HOSPITAL ENCOUNTER (EMERGENCY)
Facility: HOSPITAL | Age: 70
Discharge: HOME | End: 2025-05-26
Attending: STUDENT IN AN ORGANIZED HEALTH CARE EDUCATION/TRAINING PROGRAM | Admitting: STUDENT IN AN ORGANIZED HEALTH CARE EDUCATION/TRAINING PROGRAM
Payer: COMMERCIAL

## 2025-05-26 VITALS
SYSTOLIC BLOOD PRESSURE: 124 MMHG | OXYGEN SATURATION: 93 % | WEIGHT: 201 LBS | TEMPERATURE: 98.2 F | DIASTOLIC BLOOD PRESSURE: 58 MMHG | HEART RATE: 67 BPM | BODY MASS INDEX: 35.61 KG/M2 | RESPIRATION RATE: 16 BRPM | HEIGHT: 63 IN

## 2025-05-26 DIAGNOSIS — R05.1 ACUTE COUGH: ICD-10-CM

## 2025-05-26 DIAGNOSIS — R09.81 NASAL CONGESTION: ICD-10-CM

## 2025-05-26 DIAGNOSIS — J02.9 SORE THROAT: ICD-10-CM

## 2025-05-26 LAB
FLUAV RNA SPEC QL NAA+PROBE: NEGATIVE
FLUBV RNA RESP QL NAA+PROBE: NEGATIVE
RSV RNA SPEC NAA+PROBE: NEGATIVE
S PYO DNA THROAT QL NAA+PROBE: NOT DETECTED
SARS-COV-2 RNA RESP QL NAA+PROBE: NEGATIVE

## 2025-05-26 PROCEDURE — 71046 X-RAY EXAM CHEST 2 VIEWS: CPT

## 2025-05-26 PROCEDURE — 87651 STREP A DNA AMP PROBE: CPT | Performed by: STUDENT IN AN ORGANIZED HEALTH CARE EDUCATION/TRAINING PROGRAM

## 2025-05-26 PROCEDURE — 99284 EMERGENCY DEPT VISIT MOD MDM: CPT | Mod: 25

## 2025-05-26 PROCEDURE — 87637 SARSCOV2&INF A&B&RSV AMP PRB: CPT | Performed by: STUDENT IN AN ORGANIZED HEALTH CARE EDUCATION/TRAINING PROGRAM

## 2025-05-26 RX ORDER — DOXYCYCLINE 100 MG/1
100 CAPSULE ORAL 2 TIMES DAILY
Qty: 14 CAPSULE | Refills: 0 | Status: SHIPPED | OUTPATIENT
Start: 2025-05-26 | End: 2025-06-02

## 2025-05-26 RX ORDER — BENZONATATE 100 MG/1
100 CAPSULE ORAL 3 TIMES DAILY PRN
Qty: 12 CAPSULE | Refills: 0 | Status: SHIPPED | OUTPATIENT
Start: 2025-05-26

## 2025-05-26 ASSESSMENT — ACTIVITIES OF DAILY LIVING (ADL)
ADLS_ACUITY_SCORE: 46

## 2025-05-26 NOTE — ED PROVIDER NOTES
NAME: Carly Pelayo  AGE: 69 year old female  YOB: 1955  MRN: [id number]  EVALUATION DATE & TIME: 5/26/2025  3:43 PM    PCP: Dianelys Albert  ED PROVIDER: Chika Martin MD.    Chief Complaint   Patient presents with    Cough    Pharyngitis       FINAL IMPRESSION:  1. Acute cough    2. Nasal congestion    3. Sore throat        MEDICAL DECISION MAKING:    3:51 PM I met with the patient, obtained history, performed an initial exam, and discussed options and plan for diagnostics and treatment here in the ED.   6:10 PM Updated patient    70 y/o F with h/o COPD, GERD, HTN, HLP among others who presents with cough. Reports 3-4 days of sore throat (improving), productive cough and nasal congestion. Vitals are reassuring. No wheezing or significant shortness of breath, do not suspect COPD exacerbation. Throat exam is reassuring without evidence of marshall's, PTA, retropharyngeal abscess or other deeper space infection warranting CT. No chest pain and with reassuring vitals do not suspect ACS, PE, dissection, pericarditis, etc. CXR without acute findings. Covid/influenza/RSV and strep swabs negative. Likely component of viral URI/viral sinusitis but she does report she is coughing up a large amount of green sputum and after further discussion and h/o of COPD we decided to treat for suspected pneumonia. Recommended close outpatient follow up. Strict return precautions discussed and patient is in agreement with plan, endorses understanding and her questions were answered.      Medical Decision Making  I considered additional work up, including labs, EKG, CT chest and neck, but deferred given reassuring exam and vitals  Discharge. I prescribed additional prescription strength medication(s) as charted. I considered admission, but ultimately discharged patient given reassuring exam and vitals.    MIPS (CTPE, Dental pain, Betts, Sinusitis, Asthma/COPD, Head Trauma): Sinusitis:MDM for ABX: Antibiotics  "Prescribed: for an infection other than sinusitis    SEPSIS: None    MEDICATIONS GIVEN IN THE EMERGENCY:  Medications - No data to display    NEW PRESCRIPTIONS STARTED AT TODAY'S ER VISIT:  Discharge Medication List as of 5/26/2025  6:27 PM        START taking these medications    Details   amoxicillin-clavulanate (AUGMENTIN) 875-125 MG tablet Take 1 tablet by mouth 2 times daily for 7 days., Disp-14 tablet, R-0, Local Print      benzonatate (TESSALON) 100 MG capsule Take 1 capsule (100 mg) by mouth 3 times daily as needed for cough., Disp-12 capsule, R-0, Local Print      doxycycline hyclate (VIBRAMYCIN) 100 MG capsule Take 1 capsule (100 mg) by mouth 2 times daily for 7 days., Disp-14 capsule, R-0, Local Print              =================================================================  HPI    Patient information was obtained from: patient   Use of : N/A       Carly Pelayo is a 69 year old female with a past medical history of COPD, GERD, hyperlipidemia, hypertension, and former smoker, who presents cough.     Patient reports she had a bad sore throat 4 days ago where it was hard for her to swallow. This is improving. She then developed a cough a couple of days later with associated chills and \"chattery teeth\". She thought she just had a bad cold but then 2 days ago she started having green mucus coming out of her nose and is coughing it up as well. Today, the mucus was a dark green color. She also endorses having a headache described as a pain/pressure in her sinuses/around her eyes. No vision changes. No head injuries. She asked her pharmacist what she should do and she was told to come get evaluated. She does have a daily inhaler that she uses every day along with a rescue inhaler.     Denies any falls, chest pain, and any other complaints at this time.     PHYSICAL EXAM:    Vitals: /58   Pulse 67   Temp 98.2  F (36.8  C) (Oral)   Resp 16   Ht 1.594 m (5' 2.75\")   Wt 91.2 kg " (201 lb)   SpO2 93%   BMI 35.89 kg/m     Constitutional: Well developed, well nourished. No acute distress.  HENT: Normocephalic, atraumatic. Neck-gross ROM intact.   Eyes: Pupils mid-range and equal, EOMI, sclera white  Respiratory: CTAB, no respiratory distress  Cardiovascular: Normal heart rate, regular rhythm  Musculoskeletal: Moving extremities intentionally and without pain. No obvious deformity.  Skin: Warm, dry, no rash seen to exposed areas.  Neurologic: Alert & oriented, speech clear, CNs grossly intact, moving all extremities, no focal deficits noted, walked to exam room with steady gait and no difficulties     LAB:  All pertinent labs reviewed and interpreted.  Labs Ordered and Resulted from Time of ED Arrival to Time of ED Departure   INFLUENZA A/B, RSV AND SARS-COV2 PCR - Normal       Result Value    Influenza A PCR Negative      Influenza B PCR Negative      RSV PCR Negative      SARS CoV2 PCR Negative     GROUP A STREPTOCOCCUS PCR THROAT SWAB - Normal    Group A strep by PCR Not Detected         RADIOLOGY:  Chest XR,  PA & LAT   Final Result   IMPRESSION: Negative chest.            I, Renetta Garcia, am serving as a scribe to document services personally performed by Dr. Chika Martin based on my observation and the provider's statements to me. I, Chika Martin MD attest that Renetta Garcia is acting in a scribe capacity, has observed my performance of the services and has documented them in accordance with my direction.    Chika Martin M.D.  Emergency Medicine  [facility] EMERGENCY DEPARTMENT  [address] [place] [id number]  [phone]  Dept: [phone]      Chika Martin MD  05/26/25 [id number]

## 2025-05-26 NOTE — ED TRIAGE NOTES
Pt ambulatory to triage c/o sore throat, cough productive of green sputum and intermittent headaches for the past 3-4 days. Denies fever.

## 2025-05-26 NOTE — DISCHARGE INSTRUCTIONS
Can start tessalon pearles to help with cough  Can use albuterol inhaler as needed  Can conider neti pot for nasal congestion  Covering you with antibiotics for possible pneumonia    Most sinus congestion is from viral and we do not recommend antibiotics unless symptoms are lasting >10-14 days    Follow up closely with your primary care doctor  Return with increased difficulty breathing, unable to keep down fluids, chest pain or other worsening symptoms or concerns